# Patient Record
Sex: FEMALE | Race: BLACK OR AFRICAN AMERICAN | Employment: FULL TIME | URBAN - METROPOLITAN AREA
[De-identification: names, ages, dates, MRNs, and addresses within clinical notes are randomized per-mention and may not be internally consistent; named-entity substitution may affect disease eponyms.]

---

## 2019-12-16 ENCOUNTER — OFFICE VISIT (OUTPATIENT)
Dept: INTERNAL MEDICINE CLINIC | Age: 54
End: 2019-12-16

## 2019-12-16 VITALS
WEIGHT: 184 LBS | HEART RATE: 70 BPM | HEIGHT: 65 IN | SYSTOLIC BLOOD PRESSURE: 180 MMHG | DIASTOLIC BLOOD PRESSURE: 105 MMHG | OXYGEN SATURATION: 98 % | BODY MASS INDEX: 30.66 KG/M2 | RESPIRATION RATE: 20 BRPM | TEMPERATURE: 98.5 F

## 2019-12-16 DIAGNOSIS — Z12.31 ENCOUNTER FOR SCREENING MAMMOGRAM FOR BREAST CANCER: ICD-10-CM

## 2019-12-16 DIAGNOSIS — Z23 ENCOUNTER FOR IMMUNIZATION: ICD-10-CM

## 2019-12-16 DIAGNOSIS — Z12.11 SCREENING FOR COLON CANCER: ICD-10-CM

## 2019-12-16 DIAGNOSIS — I10 ESSENTIAL HYPERTENSION: Primary | ICD-10-CM

## 2019-12-16 DIAGNOSIS — Z13.220 SCREENING CHOLESTEROL LEVEL: ICD-10-CM

## 2019-12-16 DIAGNOSIS — R01.1 MURMUR: ICD-10-CM

## 2019-12-16 RX ORDER — AMLODIPINE BESYLATE 10 MG/1
10 TABLET ORAL DAILY
Qty: 30 TAB | Refills: 2 | Status: SHIPPED | OUTPATIENT
Start: 2019-12-16 | End: 2020-03-09

## 2019-12-16 NOTE — PROGRESS NOTES
Chief Complaint   Patient presents with   [address]     she is a 48y.o. year old female who presents for evaluation. The patient presents the office to get established. She reports that she was referred here by Alvin Maldonado NP. She reports that she has not seen a physician in approximately 2 to 3 years. She states that she has a history of high blood pressure but has not been taking medicine for it. She was on she believes hydrochlorothiazide at one point but it made her swell so she stopped the medication and she never went back to get on something different.   Allergies   Allergen Reactions    Other Plant, Animal, Environmental Sneezing    Hydrochlorothiazide Swelling     Past Medical History:   Diagnosis Date    Back pain     Constipation     intermttent    Hypertension      Family History   Problem Relation Age of Onset    Stroke Mother     Breast Cancer Sister     Hypertension Sister     Hypertension Brother     Prostate Cancer Brother     Diabetes Brother     Cancer Brother         throat     Social History     Socioeconomic History    Marital status:      Spouse name: Not on file    Number of children: 2    Years of education: Not on file    Highest education level: Not on file   Tobacco Use    Smoking status: Never Smoker    Smokeless tobacco: Never Used   Substance and Sexual Activity    Alcohol use: Never     Frequency: Never    Drug use: Never    Sexual activity: Yes     Partners: Male     Birth control/protection: None     Past Surgical History:   Procedure Laterality Date    HX PARTIAL HYSTERECTOMY  2012             Review of Systems - negative except as listed above    Objective:     Vitals:    12/16/19 1429 12/16/19 1444   BP: (!) 193/109 (!) 180/105   Pulse: 73 70   Resp: 20    Temp: 98.5 °F (36.9 °C)    TempSrc: Oral    SpO2: 98%    Weight: 184 lb (83.5 kg)    Height: 5' 5\" (1.651 m)      Physical Examination: General appearance - alert, well appearing, and in no distress  Eyes - pupils equal and reactive, extraocular eye movements intact  Mouth - mucous membranes moist, pharynx normal without lesions  Neck - supple, no significant adenopathy  Chest - clear to auscultation, no wheezes, rales or rhonchi, symmetric air entry  Heart - normal rate and regular rhythm  Abdomen - soft, nontender, nondistended, no masses or organomegaly  Extremities - no pedal edema noted, intact peripheral pulses    Assessment/ Plan:   Diagnoses and all orders for this visit:    1. Essential hypertension  -     CBC WITH AUTOMATED DIFF; Future  -     METABOLIC PANEL, COMPREHENSIVE; Future  -     amLODIPine (NORVASC) 10 mg tablet; Take 1 Tab by mouth daily.  -     ECHO ADULT COMPLETE; Future    2. Encounter for immunization  -     varicella-zoster recombinant, PF, (SHINGRIX, PF,) 50 mcg/0.5 mL susr injection; 0.5 mL by IntraMUSCular route once for 1 dose. Repeat one in 2-6 months    3. Murmur  -     ECHO ADULT COMPLETE; Future    4. Screening cholesterol level  -     CBC WITH AUTOMATED DIFF; Future  -     LIPID PANEL; Future    5. Encounter for screening mammogram for breast cancer  -     [facility] MAMMO BI SCREENING INCL CAD; Future    6. Screening for colon cancer  -     REFERRAL TO GASTROENTEROLOGY       The patient I spoke for a period of time about her blood pressure and the need to get it under control. I also would like for her to have an echo done. The patient reports that she was told that she had a murmur at a young age but had not heard anything else about it since then. Today during exam I explained her that I hear a murmur and I think we should get an echo done to follow this up. I also placed an order for the patient to have her mammogram done and to see the gastroenterologist so that she can have a colonoscopy done. The patient has agreed to be started on blood pressure medication. She will be started on Norvasc 10 mg today.   I have asked the patient to return to the office in 2 weeks that we can recheck her blood pressure. Patient also was given a prescription to get labs done today. She will be contacted with these results once they are back. I have discussed the diagnosis with the patient and the intended plan as seen in the above orders. The patient has received an after-visit summary and questions were answered concerning future plans.      Medication Side Effects and Warnings were discussed with patient: yes  Patient Labs were reviewed and or requested: n/a  Patient Past Records were reviewed and or requested  yes    Yesi Ferreira PA-C

## 2019-12-16 NOTE — PROGRESS NOTES
Patient states she takes Neurontin 300mg, does not have pill bottle with her today, they have  and needs refill. Patient takes for back pain. Previous PCP Dr. Vera Austin, retired, [place]. PAP done last . Patient does not take any BP meds. Last PCP visit in 3 years.

## 2019-12-17 ENCOUNTER — HOSPITAL ENCOUNTER (OUTPATIENT)
Dept: LAB | Age: 54
Discharge: HOME | End: 2019-12-17

## 2019-12-17 DIAGNOSIS — I10 ESSENTIAL HYPERTENSION: ICD-10-CM

## 2019-12-17 DIAGNOSIS — Z13.220 SCREENING CHOLESTEROL LEVEL: ICD-10-CM

## 2019-12-17 LAB
ALBUMIN SERPL-MCNC: 3.5 G/DL (ref 3.5–5)
ALBUMIN/GLOB SERPL: 0.9 {RATIO} (ref 1.1–2.2)
ALP SERPL-CCNC: 111 U/L (ref 45–117)
ALT SERPL-CCNC: 24 U/L (ref 12–78)
ANION GAP SERPL CALC-SCNC: 2 MMOL/L (ref 5–15)
AST SERPL-CCNC: 20 U/L (ref 15–37)
BASOPHILS # BLD: 0 K/UL (ref 0–0.1)
BASOPHILS NFR BLD: 0 % (ref 0–1)
BILIRUB SERPL-MCNC: 0.4 MG/DL (ref 0.2–1)
BUN SERPL-MCNC: 19 MG/DL (ref 6–20)
BUN/CREAT SERPL: 22 (ref 12–20)
CALCIUM SERPL-MCNC: 9 MG/DL (ref 8.5–10.1)
CHLORIDE SERPL-SCNC: 108 MMOL/L (ref 97–108)
CHOLEST SERPL-MCNC: 157 MG/DL
CO2 SERPL-SCNC: 29 MMOL/L (ref 21–32)
CREAT SERPL-MCNC: 0.86 MG/DL (ref 0.55–1.02)
DIFFERENTIAL METHOD BLD: NORMAL
EOSINOPHIL # BLD: 0.2 K/UL (ref 0–0.4)
EOSINOPHIL NFR BLD: 4 % (ref 0–7)
ERYTHROCYTE [DISTWIDTH] IN BLOOD BY AUTOMATED COUNT: 13.2 % (ref 11.5–14.5)
GLOBULIN SER CALC-MCNC: 3.8 G/DL (ref 2–4)
GLUCOSE SERPL-MCNC: 84 MG/DL (ref 65–100)
HCT VFR BLD AUTO: 42.5 % (ref 35–47)
HDLC SERPL-MCNC: 85 MG/DL
HDLC SERPL: 1.8 {RATIO} (ref 0–5)
HGB BLD-MCNC: 13.4 G/DL (ref 11.5–16)
IMM GRANULOCYTES # BLD AUTO: 0 K/UL (ref 0–0.04)
IMM GRANULOCYTES NFR BLD AUTO: 0 % (ref 0–0.5)
LDLC SERPL CALC-MCNC: 60.6 MG/DL (ref 0–100)
LIPID PROFILE,FLP: NORMAL
LYMPHOCYTES # BLD: 1.7 K/UL (ref 0.8–3.5)
LYMPHOCYTES NFR BLD: 28 % (ref 12–49)
MCH RBC QN AUTO: 29.1 PG (ref 26–34)
MCHC RBC AUTO-ENTMCNC: 31.5 G/DL (ref 30–36.5)
MCV RBC AUTO: 92.2 FL (ref 80–99)
MONOCYTES # BLD: 0.6 K/UL (ref 0–1)
MONOCYTES NFR BLD: 10 % (ref 5–13)
NEUTS SEG # BLD: 3.5 K/UL (ref 1.8–8)
NEUTS SEG NFR BLD: 58 % (ref 32–75)
NRBC # BLD: 0 K/UL (ref 0–0.01)
NRBC BLD-RTO: 0 PER 100 WBC
PLATELET # BLD AUTO: 160 K/UL (ref 150–400)
PMV BLD AUTO: 11.7 FL (ref 8.9–12.9)
POTASSIUM SERPL-SCNC: 3.8 MMOL/L (ref 3.5–5.1)
PROT SERPL-MCNC: 7.3 G/DL (ref 6.4–8.2)
RBC # BLD AUTO: 4.61 M/UL (ref 3.8–5.2)
SODIUM SERPL-SCNC: 139 MMOL/L (ref 136–145)
TRIGL SERPL-MCNC: 57 MG/DL (ref ?–150)
VLDLC SERPL CALC-MCNC: 11.4 MG/DL
WBC # BLD AUTO: 6 K/UL (ref 3.6–11)

## 2019-12-18 NOTE — PROGRESS NOTES
Please let the patient know her cholesterol looks good. Her CMP looks good. Her CBC looks good. Thank you.

## 2019-12-24 ENCOUNTER — OFFICE VISIT (OUTPATIENT)
Dept: INTERNAL MEDICINE CLINIC | Age: 54
End: 2019-12-24

## 2019-12-24 VITALS
WEIGHT: 181 LBS | BODY MASS INDEX: 30.16 KG/M2 | HEART RATE: 72 BPM | SYSTOLIC BLOOD PRESSURE: 146 MMHG | TEMPERATURE: 98.4 F | OXYGEN SATURATION: 99 % | DIASTOLIC BLOOD PRESSURE: 98 MMHG | HEIGHT: 65 IN

## 2019-12-24 DIAGNOSIS — I10 ESSENTIAL HYPERTENSION: Primary | ICD-10-CM

## 2019-12-24 RX ORDER — NITROFURANTOIN 25; 75 MG/1; MG/1
CAPSULE ORAL
COMMUNITY
Start: 2019-12-15 | End: 2020-04-14 | Stop reason: ALTCHOICE

## 2019-12-24 RX ORDER — OLMESARTAN MEDOXOMIL 5 MG/1
5 TABLET ORAL DAILY
Qty: 30 TAB | Refills: 1 | Status: SHIPPED | OUTPATIENT
Start: 2019-12-24 | End: 2020-01-16

## 2019-12-24 NOTE — PROGRESS NOTES
Subjective:     Dane Julio is a 48 y.o. female who presents for follow up of hypertension. Diet and Lifestyle: patient reports she is starting to watch her diet more. Home BP Monitoring: is not measured at home    Cardiovascular ROS: taking medications as instructed, no medication side effects noted, no TIA's, no chest pain on exertion, no dyspnea on exertion, no swelling of ankles. New concerns: recheck of her blood pressure. Reports she had a slight headache at first start of blood pressure medication but that went away. There are no active problems to display for this patient. Current Outpatient Medications   Medication Sig Dispense Refill    olmesartan (BENICAR) 5 mg tablet Take 1 Tab by mouth daily. 30 Tab 1    amLODIPine (NORVASC) 10 mg tablet Take 1 Tab by mouth daily. 30 Tab 2    nitrofurantoin, macrocrystal-monohydrate, (MACROBID) 100 mg capsule        Allergies   Allergen Reactions    Other Plant, Animal, Environmental Sneezing    Hydrochlorothiazide Swelling             Review of Systems, additional:  Pertinent items are noted in HPI. Objective:     Visit Vitals  BP (!) 146/98   Pulse 72   Temp 98.4 °F (36.9 °C) (Oral)   Ht 5' 5\" (1.651 m)   Wt 181 lb (82.1 kg)   SpO2 99%   BMI 30.12 kg/m²     Appearance: alert, well appearing, and in no distress. General exam: CVS exam BP noted to be mildly elevated today in office, S1, S2 normal, no gallop, murmur heard, chest clear, , no edema. Lab review: today I reviewed the patient's last set of labs. Assessment/Plan:     hypertension needs improvement. added benicar today. Diagnoses and all orders for this visit:    1. Essential hypertension  -     olmesartan (BENICAR) 5 mg tablet; Take 1 Tab by mouth daily. patient's blood pressure has improved since the last time but still not at goal. Will add benicar today and she is to check her blood pressure at work. benicar may need increasing based on the numbers.  Also will need to check for the patient cardiologist that go to [facility] due to her insurance. She will follow up here in one month. Advised patient to try to add regular exercise and watch sodium intake.

## 2020-03-09 DIAGNOSIS — I10 ESSENTIAL HYPERTENSION: ICD-10-CM

## 2020-03-09 RX ORDER — AMLODIPINE BESYLATE 10 MG/1
TABLET ORAL
Qty: 90 TAB | Refills: 0 | Status: SHIPPED | OUTPATIENT
Start: 2020-03-09 | End: 2020-06-03

## 2020-04-14 ENCOUNTER — VIRTUAL VISIT (OUTPATIENT)
Dept: INTERNAL MEDICINE CLINIC | Age: 55
End: 2020-04-14

## 2020-04-14 VITALS
RESPIRATION RATE: 16 BRPM | SYSTOLIC BLOOD PRESSURE: 128 MMHG | DIASTOLIC BLOOD PRESSURE: 75 MMHG | HEIGHT: 65 IN | WEIGHT: 182 LBS | HEART RATE: 61 BPM | BODY MASS INDEX: 30.32 KG/M2

## 2020-04-14 DIAGNOSIS — I10 ESSENTIAL HYPERTENSION: Primary | ICD-10-CM

## 2020-04-14 DIAGNOSIS — R60.9 PERIPHERAL EDEMA: ICD-10-CM

## 2020-04-14 NOTE — PROGRESS NOTES
Consent: Camie Cooks, who was seen by synchronous (real-time) audio-video technology, and/or her healthcare decision maker, is aware that this patient-initiated, Telehealth encounter on 4/14/2020 is a billable service, with coverage as determined by her insurance carrier. She is aware that she may receive a bill and has provided verbal consent to proceed: Yes. Assessment & Plan:   Diagnoses and all orders for this visit:    1. Essential hypertension    2. Peripheral edema    Patient reports her blood pressure has been doing well at home. I explained to her that some people do experience some peripheral edema with the use of amlodipine. I have asked her to cut the amlodipine in half. Instead of taking in 10 mg daily she now will be taking 5 mg daily. She will increase her Benicar from 5 mg daily to 10 mg daily. She will follow-up with me in 1 week to let me know if the peripheral edema is better. I advised the patient to try to stay as active as she can do on this COVID-19 pandemic. Advised patient make sure that she is staying well-hydrated on throughout the day. I spent at least 15 minutes with this established patient, and >50% of the time was spent counseling and/or coordinating care regarding hypertension and swelling of ankles  712  Subjective:   Camie Cooks is a 48 y.o. female who was seen for Ankle swelling and Hand Swelling    Patient presents with concerns of some edema for the past 3 weeks or so. She reports that she has been noticing that she has some edema of her ankles and her hands. She states that it goes down by the morning time. Patient reports that she does not use sodium when cooking. She does not believe she is increased any hidden sodium either. The patient denies shortness of breath or chest pain. She last had labs done back in December. Patient reports currently she is working a altered schedule.   Prior to Admission medications    Medication Sig Start Date End Date Taking? Authorizing Provider   amLODIPine (NORVASC) 10 mg tablet TAKE 1 TABLET BY MOUTH EVERY DAY 3/9/20  Yes Yesi Ferreira PA-C   olmesartan (BENICAR) 5 mg tablet TAKE 1 TABLET BY MOUTH EVERY DAY 1/16/20  Yes Yesi Ferreira PA-C   nitrofurantoin, macrocrystal-monohydrate, (MACROBID) 100 mg capsule  12/15/19 4/14/20  Provider, Historical     Allergies   Allergen Reactions    Other Plant, Animal, Environmental Sneezing    Hydrochlorothiazide Swelling       There are no active problems to display for this patient. Current Outpatient Medications   Medication Sig Dispense Refill    amLODIPine (NORVASC) 10 mg tablet TAKE 1 TABLET BY MOUTH EVERY DAY 90 Tab 0    olmesartan (BENICAR) 5 mg tablet TAKE 1 TABLET BY MOUTH EVERY DAY 90 Tab 1     Allergies   Allergen Reactions    Other Plant, Animal, Environmental Sneezing    Hydrochlorothiazide Swelling       ROS      Objective:     Visit Vitals  /75   Pulse 61   Resp 16   Ht 5' 5\" (1.651 m)   Wt 182 lb (82.6 kg)   BMI 30.29 kg/m²      General: alert, cooperative, no distress   Mental  status: normal mood, behavior, speech, dress, motor activity, and thought processes, able to follow commands   HENT: NCAT   Neck: no visualized mass   Resp: no respiratory distress   Neuro: no gross deficits   Skin: no discoloration or lesions of concern on visible areas   Psychiatric: normal affect, consistent with stated mood, no evidence of hallucinations     Additional exam findings: We discussed the expected course, resolution and complications of the diagnosis(es) in detail. Medication risks, benefits, costs, interactions, and alternatives were discussed as indicated. I advised her to contact the office if her condition worsens, changes or fails to improve as anticipated. She expressed understanding with the diagnosis(es) and plan. Vianey Hawley is a 48 y.o. female being evaluated by a video visit encounter for concerns as above.   DAVON caregiver was present when appropriate. Due to this being a TeleHealth encounter (During ILBNY-21 public health emergency), evaluation of the following organ systems was limited: Vitals/Constitutional/EENT/Resp/CV/GI//MS/Neuro/Skin/Heme-Lymph-Imm. Pursuant to the emergency declaration under the [address] [place] [address], [facility] waiver authority and the [employer] and [employer] Act, this Virtual  Visit was conducted, with patient's (and/or legal guardian's) consent, to reduce the patient's risk of exposure to COVID-19 and provide necessary medical care. Services were provided through a video synchronous discussion virtually to substitute for in-person clinic visit. Patient and provider were located at their individual homes.         Delta Ferreira PA-C

## 2020-05-20 DIAGNOSIS — I10 ESSENTIAL HYPERTENSION: ICD-10-CM

## 2020-05-20 NOTE — TELEPHONE ENCOUNTER
Cousins, Crystal [employer]             Patient would like a refill of her blood pressure medication she does not know the name of it, but its not the Amlodipine. The pharmacy is [facility], and patient is out of the medication.

## 2020-05-21 RX ORDER — OLMESARTAN MEDOXOMIL 5 MG/1
TABLET ORAL
Qty: 90 TAB | Refills: 1 | Status: SHIPPED | OUTPATIENT
Start: 2020-05-21 | End: 2020-07-15 | Stop reason: SDUPTHER

## 2020-06-03 DIAGNOSIS — I10 ESSENTIAL HYPERTENSION: ICD-10-CM

## 2020-06-03 RX ORDER — AMLODIPINE BESYLATE 10 MG/1
TABLET ORAL
Qty: 90 TAB | Refills: 0 | Status: SHIPPED | OUTPATIENT
Start: 2020-06-03 | End: 2020-09-07 | Stop reason: DRUGHIGH

## 2020-07-15 DIAGNOSIS — I10 ESSENTIAL HYPERTENSION: ICD-10-CM

## 2020-07-15 RX ORDER — OLMESARTAN MEDOXOMIL 5 MG/1
TABLET ORAL
Qty: 90 TAB | Refills: 1 | Status: SHIPPED | OUTPATIENT
Start: 2020-07-15 | End: 2020-09-17 | Stop reason: SDUPTHER

## 2020-07-30 ENCOUNTER — VIRTUAL VISIT (OUTPATIENT)
Dept: INTERNAL MEDICINE CLINIC | Age: 55
End: 2020-07-30

## 2020-07-30 DIAGNOSIS — R23.2 HOT FLASHES: Primary | ICD-10-CM

## 2020-07-30 RX ORDER — GABAPENTIN 300 MG/1
300 CAPSULE ORAL
Qty: 90 CAP | Refills: 0 | Status: SHIPPED | OUTPATIENT
Start: 2020-07-30 | End: 2022-09-14 | Stop reason: SDUPTHER

## 2020-07-30 NOTE — PROGRESS NOTES
Rika De Guzman is a 48 y.o. female who was seen by synchronous (real-time) audio-video technology on 7/30/2020 for Hormone Problem        Assessment & Plan:   Diagnoses and all orders for this visit:    1. Hot flashes  -     gabapentin (NEURONTIN) 300 mg capsule; Take 1 Cap by mouth nightly. Max Daily Amount: 300 mg. The patient I spoke about options for treating hot flashes. We talked about SSRIs versus hormone therapy versus gabapentin. I explained to her that gabapentin has shown to help those women that experience hot flashes at night. The patient will be placed back on gabapentin 300 mg to be taken at night. She will let me know if this helps or not. Also advised the patient to try to get some regular exercise if possible and to maintain a healthy diet. Advised her to stay away from spicy foods. She reports that she is noticed that if she has foods with vinegar that it tends to bring her hot flashes on. I spent at least 15 minutes on this visit with this established patient. 712  Subjective:   Patient presents to the office for evaluation of hot flashes. She reports for the past several months the hot flashes seem to be getting worse at night. She reports that she does get episodes during the day but they are tolerable. The episode that she experienced at night is so severe that she is not able to sleep well. Patient reports at one time she was on gabapentin for pain. She reports that she has been without this medication for some time now. Prior to Admission medications    Medication Sig Start Date End Date Taking? Authorizing Provider   gabapentin (NEURONTIN) 300 mg capsule Take 1 Cap by mouth nightly. Max Daily Amount: 300 mg. 7/30/20  Yes Yesi Ferreira PA-C   olmesartan (BENICAR) 5 mg tablet TAKE 1 TABLET BY MOUTH EVERY DAY  Patient taking differently: Take 10 mg by mouth daily.  TAKE 1 TABLET BY MOUTH EVERY DAY 7/15/20  Yes Yesi Ferreira PA-C   amLODIPine (NORVASC) 10 mg tablet TAKE 1 TABLET BY MOUTH EVERY DAY  Patient taking differently: Take 5 mg by mouth daily. .  6/3/20  Yes Yesi Ferreira, TROY     There are no active problems to display for this patient. Current Outpatient Medications   Medication Sig Dispense Refill    gabapentin (NEURONTIN) 300 mg capsule Take 1 Cap by mouth nightly. Max Daily Amount: 300 mg. 90 Cap 0    olmesartan (BENICAR) 5 mg tablet TAKE 1 TABLET BY MOUTH EVERY DAY (Patient taking differently: Take 10 mg by mouth daily. TAKE 1 TABLET BY MOUTH EVERY DAY) 90 Tab 1    amLODIPine (NORVASC) 10 mg tablet TAKE 1 TABLET BY MOUTH EVERY DAY (Patient taking differently: Take 5 mg by mouth daily. Tresea Avi ) 90 Tab 0     Allergies   Allergen Reactions    Other Plant, Animal, Environmental Sneezing    Hydrochlorothiazide Swelling       ROS    Objective:     Patient-Reported Vitals 7/30/2020   Patient-Reported Weight 186lb   Patient-Reported Height [id number]      General: alert, cooperative, no distress   Mental  status: normal mood, behavior, speech, dress, motor activity, and thought processes, able to follow commands   HENT: NCAT   Neck: no visualized mass   Resp: no respiratory distress   Neuro: no gross deficits   Skin: no discoloration or lesions of concern on visible areas   Psychiatric: normal affect, consistent with stated mood, no evidence of hallucinations     Additional exam findings: We discussed the expected course, resolution and complications of the diagnosis(es) in detail. Medication risks, benefits, costs, interactions, and alternatives were discussed as indicated. I advised her to contact the office if her condition worsens, changes or fails to improve as anticipated. She expressed understanding with the diagnosis(es) and plan.        Rika De Guzman, who was evaluated through a patient-initiated, synchronous (real-time) audio-video encounter, and/or her healthcare decision maker, is aware that it is a billable service, with coverage as determined by her insurance carrier. She provided verbal consent to proceed: Yes, and patient identification was verified. It was conducted pursuant to the emergency declaration under the [address] [place] [address] and the [employer] General Act. A caregiver was present when appropriate. Ability to conduct physical exam was limited. I was at home. The patient was at home.       Orlando Ferreira PA-C

## 2020-09-07 ENCOUNTER — TELEPHONE (OUTPATIENT)
Dept: INTERNAL MEDICINE CLINIC | Age: 55
End: 2020-09-07

## 2020-09-07 RX ORDER — AMLODIPINE BESYLATE 5 MG/1
5 TABLET ORAL DAILY
Qty: 90 TAB | Refills: 0 | Status: SHIPPED | OUTPATIENT
Start: 2020-09-07 | End: 2020-12-18

## 2020-09-07 NOTE — TELEPHONE ENCOUNTER
norvasc 5 mg has been sent to the pharmacy.  Patient had reported at last visit she was taking half of the 10 mg tablet

## 2020-09-16 DIAGNOSIS — I10 ESSENTIAL HYPERTENSION: ICD-10-CM

## 2020-09-17 RX ORDER — OLMESARTAN MEDOXOMIL 5 MG/1
10 TABLET ORAL DAILY
Qty: 180 TAB | Refills: 0 | Status: SHIPPED | OUTPATIENT
Start: 2020-09-17 | End: 2020-12-10 | Stop reason: DRUGHIGH

## 2020-09-17 RX ORDER — OLMESARTAN MEDOXOMIL 5 MG/1
TABLET ORAL
Qty: 90 TAB | Refills: 1 | OUTPATIENT
Start: 2020-09-17

## 2020-11-30 ENCOUNTER — TELEPHONE (OUTPATIENT)
Dept: INTERNAL MEDICINE CLINIC | Age: 55
End: 2020-11-30

## 2020-12-10 ENCOUNTER — TELEPHONE (OUTPATIENT)
Dept: INTERNAL MEDICINE CLINIC | Age: 55
End: 2020-12-10

## 2020-12-10 RX ORDER — OLMESARTAN MEDOXOMIL 5 MG/1
10 TABLET ORAL DAILY
Qty: 180 TAB | Refills: 1 | Status: SHIPPED | OUTPATIENT
Start: 2020-12-10 | End: 2021-06-01

## 2020-12-18 RX ORDER — AMLODIPINE BESYLATE 5 MG/1
TABLET ORAL
Qty: 90 TAB | Refills: 0 | Status: SHIPPED | OUTPATIENT
Start: 2020-12-18 | End: 2021-03-15

## 2021-01-15 ENCOUNTER — VIRTUAL VISIT (OUTPATIENT)
Dept: INTERNAL MEDICINE CLINIC | Age: 56
End: 2021-01-15
Payer: COMMERCIAL

## 2021-01-15 ENCOUNTER — TELEPHONE (OUTPATIENT)
Dept: INTERNAL MEDICINE CLINIC | Age: 56
End: 2021-01-15

## 2021-01-15 DIAGNOSIS — M79.671 PAIN OF RIGHT HEEL: Primary | ICD-10-CM

## 2021-01-15 PROCEDURE — 99213 OFFICE O/P EST LOW 20 MIN: CPT | Performed by: PHYSICIAN ASSISTANT

## 2021-01-15 RX ORDER — DICLOFENAC SODIUM 50 MG/1
50 TABLET, DELAYED RELEASE ORAL 2 TIMES DAILY
Qty: 30 TAB | Refills: 0 | Status: SHIPPED | OUTPATIENT
Start: 2021-01-15 | End: 2021-01-26

## 2021-01-15 NOTE — PROGRESS NOTES
Celia Durham is a 50 y.o. female who was seen by synchronous (real-time) audio-video technology on 1/15/2021 for Swelling        Assessment & Plan:   Diagnoses and all orders for this visit:    1. Pain of right heel  -     diclofenac EC (VOLTAREN) 50 mg EC tablet; Take 1 Tab by mouth two (2) times a day. I explained to the patient is on the symptoms that she is describing sounds like plantar fasciitis. I have advised the patient to take her medication as prescribed and to try ice throughout the day if possible. Also advised the patient of some light stretches that she can do. Told her that if the stretches causes pain she is not to do them. Encouraged the patient to get the boot as prescribed by the orthopedic as soon as possible. Patient should contact the office if symptoms are not improving. I spent at least 20 minutes on this visit with this established patient. 712  Subjective:   Patient presents for right heel pain and foot pain. She reports that she saw the orthopedic doctor back on December 7 and was diagnosed with tendinitis. She reports that since that time she has been taking Advil intermittently approximately 600 mg twice a day. She reports that the orthopedic doctor advised her to get a boot but she has not been to pick that up yet. Patient reports that she plans to try to pick the buildup today. Patient states that she does a lot of walking at work and believes this may be irritating the area. She reports that first thing in the morning her heel is extremely sore. When she is up and moving throughout the day she also has some swelling of her ankle. Patient reports that she had x-rays done and no abnormality was seen. She is scheduled to follow-up with the orthopedic doctor on the 22nd. Prior to Admission medications    Medication Sig Start Date End Date Taking? Authorizing Provider   diclofenac EC (VOLTAREN) 50 mg EC tablet Take 1 Tab by mouth two (2) times a day.  1/15/21  Yes Hanna, Yesi ASHLI PA-C   amLODIPine (NORVASC) 5 mg tablet TAKE 1 TABLET BY MOUTH EVERY DAY 12/18/20  Yes Pennington, Yesi ASHLI PA-C   olmesartan (BENICAR) 5 mg tablet Take 2 Tabs by mouth daily. 12/10/20  Yes Hanna, Yesi ASHLI PA-C   gabapentin (NEURONTIN) 300 mg capsule Take 1 Cap by mouth nightly. Max Daily Amount: 300 mg. Patient taking differently: Take 300 mg by mouth nightly as needed. 7/30/20  Yes Hanna Yesi ASHLI PA-C     There are no active problems to display for this patient. Current Outpatient Medications   Medication Sig Dispense Refill    diclofenac EC (VOLTAREN) 50 mg EC tablet Take 1 Tab by mouth two (2) times a day. 30 Tab 0    amLODIPine (NORVASC) 5 mg tablet TAKE 1 TABLET BY MOUTH EVERY DAY 90 Tab 0    olmesartan (BENICAR) 5 mg tablet Take 2 Tabs by mouth daily. 180 Tab 1    gabapentin (NEURONTIN) 300 mg capsule Take 1 Cap by mouth nightly. Max Daily Amount: 300 mg. (Patient taking differently: Take 300 mg by mouth nightly as needed.) 90 Cap 0     Allergies   Allergen Reactions    Other Plant, Animal, Environmental Sneezing    Hydrochlorothiazide Swelling       ROS  See above   Objective:     Patient-Reported Vitals 1/15/2021   Patient-Reported Weight -   Patient-Reported Height -   Patient-Reported Pulse 82   Patient-Reported Temperature 98.7   Patient-Reported SpO2 100   Patient-Reported Systolic  347   Patient-Reported Diastolic 80      General: alert, cooperative, no distress   Mental  status: normal mood, behavior, speech, dress, motor activity, and thought processes, able to follow commands   HENT: NCAT   Neck: no visualized mass   Resp: no respiratory distress   Neuro: no gross deficits   Skin: no discoloration or lesions of concern on visible areas   Psychiatric: normal affect, consistent with stated mood, no evidence of hallucinations   Right footmild edema noted along the lateral aspect of ankle. Tenderness noted over the calcaneus.   Range of motion is within normal limits. Additional exam findings: We discussed the expected course, resolution and complications of the diagnosis(es) in detail. Medication risks, benefits, costs, interactions, and alternatives were discussed as indicated. I advised her to contact the office if her condition worsens, changes or fails to improve as anticipated. She expressed understanding with the diagnosis(es) and plan. Geri Lambert, who was evaluated through a patient-initiated, synchronous (real-time) audio-video encounter, and/or her healthcare decision maker, is aware that it is a billable service, with coverage as determined by her insurance carrier. She provided verbal consent to proceed: Yes, and patient identification was verified. It was conducted pursuant to the emergency declaration under the [address] [place] [address] authority and the [employer] General Act. A caregiver was present when appropriate. Ability to conduct physical exam was limited. I was at home. The patient was at home.       Fariba Ferreira PA-C

## 2021-01-26 DIAGNOSIS — M79.671 PAIN OF RIGHT HEEL: ICD-10-CM

## 2021-01-26 RX ORDER — DICLOFENAC SODIUM 50 MG/1
TABLET, DELAYED RELEASE ORAL
Qty: 30 TAB | Refills: 0 | Status: SHIPPED | OUTPATIENT
Start: 2021-01-26 | End: 2021-02-15

## 2021-02-14 DIAGNOSIS — M79.671 PAIN OF RIGHT HEEL: ICD-10-CM

## 2021-02-15 RX ORDER — DICLOFENAC SODIUM 50 MG/1
TABLET, DELAYED RELEASE ORAL
Qty: 30 TAB | Refills: 0 | Status: SHIPPED | OUTPATIENT
Start: 2021-02-15 | End: 2021-03-16

## 2021-02-25 ENCOUNTER — VIRTUAL VISIT (OUTPATIENT)
Dept: INTERNAL MEDICINE CLINIC | Age: 56
End: 2021-02-25
Payer: COMMERCIAL

## 2021-02-25 DIAGNOSIS — J30.89 ENVIRONMENTAL AND SEASONAL ALLERGIES: ICD-10-CM

## 2021-02-25 DIAGNOSIS — H02.89 EYELID PAIN, LEFT: Primary | ICD-10-CM

## 2021-02-25 PROCEDURE — 99213 OFFICE O/P EST LOW 20 MIN: CPT | Performed by: PHYSICIAN ASSISTANT

## 2021-02-25 RX ORDER — FLUTICASONE PROPIONATE 50 MCG
2 SPRAY, SUSPENSION (ML) NASAL DAILY
Qty: 1 BOTTLE | Refills: 1 | Status: SHIPPED | OUTPATIENT
Start: 2021-02-25 | End: 2021-03-19

## 2021-02-25 RX ORDER — ERYTHROMYCIN 5 MG/G
OINTMENT OPHTHALMIC
Qty: 3.5 G | Refills: 0 | Status: SHIPPED | OUTPATIENT
Start: 2021-02-25 | End: 2021-06-04 | Stop reason: ALTCHOICE

## 2021-02-25 NOTE — PROGRESS NOTES
Christ Ortiz is a 50 y.o. female who was seen by synchronous (real-time) audio-video technology on 2/25/2021 for Eye Problem        Assessment & Plan:   Diagnoses and all orders for this visit:    1. Eyelid pain, left  -     erythromycin (ILOTYCIN) ophthalmic ointment; Apply a small ribbon to the left eye lid TID for 7 days    2. Environmental and seasonal allergies  -     fluticasone propionate (FLONASE) 50 mcg/actuation nasal spray; 2 Sprays by Both Nostrils route daily. Advised patient that if providers not improve or she notices different symptoms to please follow-up. May continue to use warm compress to the area. I spent at least 20 minutes on this visit with this established patient. 712  Subjective:   Patient presents for evaluation of her left eye. She states last night she started having some pain of the left eyelid. This morning when she woke up the eyelid was swollen. She reports she is not able to appreciate a bump at this time. Patient did use a warm compress to the area and states that it felt little better. She reports that the eye is sore. She denies any changes to her vision. Prior to Admission medications    Medication Sig Start Date End Date Taking? Authorizing Provider   fluticasone propionate (FLONASE) 50 mcg/actuation nasal spray 2 Sprays by Both Nostrils route daily. 2/25/21  Yes [place] Yesi ASHLI PA-C   erythromycin (ILOTYCIN) ophthalmic ointment Apply a small ribbon to the left eye lid TID for 7 days 2/25/21  Yes [place], Yesi ASHLI, PA-C   diclofenac EC (VOLTAREN) 50 mg EC tablet TAKE 1 TABLET BY MOUTH TWICE A DAY 2/15/21  Yes [place], Yesi D, PA-C   amLODIPine (NORVASC) 5 mg tablet TAKE 1 TABLET BY MOUTH EVERY DAY 12/18/20  Yes [place] Yesi D, PA-C   olmesartan (BENICAR) 5 mg tablet Take 2 Tabs by mouth daily. 12/10/20  Yes [place], Yesi D, PA-C   gabapentin (NEURONTIN) 300 mg capsule Take 1 Cap by mouth nightly. Max Daily Amount: 300 mg.   Patient taking differently: Take 300 mg by mouth nightly as needed. 7/30/20  Yes Yesi Ferreira, TROY     There are no active problems to display for this patient. Current Outpatient Medications   Medication Sig Dispense Refill    fluticasone propionate (FLONASE) 50 mcg/actuation nasal spray 2 Sprays by Both Nostrils route daily. 1 Bottle 1    erythromycin (ILOTYCIN) ophthalmic ointment Apply a small ribbon to the left eye lid TID for 7 days 3.5 g 0    diclofenac EC (VOLTAREN) 50 mg EC tablet TAKE 1 TABLET BY MOUTH TWICE A DAY 30 Tab 0    amLODIPine (NORVASC) 5 mg tablet TAKE 1 TABLET BY MOUTH EVERY DAY 90 Tab 0    olmesartan (BENICAR) 5 mg tablet Take 2 Tabs by mouth daily. 180 Tab 1    gabapentin (NEURONTIN) 300 mg capsule Take 1 Cap by mouth nightly. Max Daily Amount: 300 mg. (Patient taking differently: Take 300 mg by mouth nightly as needed.) 90 Cap 0     Allergies   Allergen Reactions    Other Plant, Animal, Environmental Sneezing    Hydrochlorothiazide Swelling       ROS  See above  Objective:     Patient-Reported Vitals 1/15/2021   Patient-Reported Weight -   Patient-Reported Height -   Patient-Reported Pulse 82   Patient-Reported Temperature 98.7   Patient-Reported SpO2 100   Patient-Reported Systolic  629   Patient-Reported Diastolic 80      General: alert, cooperative, no distress   Mental  status: normal mood, behavior, speech, dress, motor activity, and thought processes, able to follow commands   HENT: Left eye- edema noted of the left upper lid. Tenderness noted. Patient denies pain of the eye or visual changes   Neck: no visualized mass   Resp: no respiratory distress   Neuro: no gross deficits   Skin: no discoloration or lesions of concern on visible areas   Psychiatric: normal affect, consistent with stated mood, no evidence of hallucinations     Additional exam findings: We discussed the expected course, resolution and complications of the diagnosis(es) in detail.   Medication risks, benefits, costs, interactions, and alternatives were discussed as indicated. I advised her to contact the office if her condition worsens, changes or fails to improve as anticipated. She expressed understanding with the diagnosis(es) and plan. Librado Man, who was evaluated through a patient-initiated, synchronous (real-time) audio-video encounter, and/or her healthcare decision maker, is aware that it is a billable service, with coverage as determined by her insurance carrier. She provided verbal consent to proceed: Yes, and patient identification was verified. It was conducted pursuant to the emergency declaration under the [address] [place] [address], [address] [place] [address] authority and the [employer] General Act. A caregiver was present when appropriate. Ability to conduct physical exam was limited. I was at home. The patient was at home.       Romulo Ferreira PA-C

## 2021-03-16 DIAGNOSIS — M79.671 PAIN OF RIGHT HEEL: ICD-10-CM

## 2021-03-16 RX ORDER — DICLOFENAC SODIUM 50 MG/1
TABLET, DELAYED RELEASE ORAL
Qty: 30 TAB | Refills: 0 | Status: SHIPPED | OUTPATIENT
Start: 2021-03-16

## 2021-03-19 DIAGNOSIS — J30.89 ENVIRONMENTAL AND SEASONAL ALLERGIES: ICD-10-CM

## 2021-03-19 RX ORDER — FLUTICASONE PROPIONATE 50 MCG
SPRAY, SUSPENSION (ML) NASAL
Qty: 1 BOTTLE | Refills: 1 | Status: SHIPPED | OUTPATIENT
Start: 2021-03-19 | End: 2021-04-13

## 2021-04-13 DIAGNOSIS — J30.89 ENVIRONMENTAL AND SEASONAL ALLERGIES: ICD-10-CM

## 2021-04-13 RX ORDER — FLUTICASONE PROPIONATE 50 MCG
SPRAY, SUSPENSION (ML) NASAL
Qty: 1 BOTTLE | Refills: 1 | Status: SHIPPED | OUTPATIENT
Start: 2021-04-13 | End: 2021-04-27 | Stop reason: SDUPTHER

## 2021-04-23 ENCOUNTER — TELEPHONE (OUTPATIENT)
Dept: INTERNAL MEDICINE CLINIC | Age: 56
End: 2021-04-23

## 2021-04-23 NOTE — TELEPHONE ENCOUNTER
Please schedule the patient for an appointment to follow up blood pressure and she is due labs.  Thank you

## 2021-04-27 DIAGNOSIS — J30.89 ENVIRONMENTAL AND SEASONAL ALLERGIES: ICD-10-CM

## 2021-04-27 RX ORDER — FLUTICASONE PROPIONATE 50 MCG
SPRAY, SUSPENSION (ML) NASAL
Qty: 1 BOTTLE | Refills: 1 | Status: SHIPPED | OUTPATIENT
Start: 2021-04-27

## 2021-06-04 ENCOUNTER — OFFICE VISIT (OUTPATIENT)
Dept: INTERNAL MEDICINE CLINIC | Age: 56
End: 2021-06-04
Payer: COMMERCIAL

## 2021-06-04 VITALS
TEMPERATURE: 98.3 F | HEIGHT: 65 IN | SYSTOLIC BLOOD PRESSURE: 136 MMHG | BODY MASS INDEX: 33.49 KG/M2 | HEART RATE: 61 BPM | WEIGHT: 201 LBS | OXYGEN SATURATION: 99 % | DIASTOLIC BLOOD PRESSURE: 84 MMHG | RESPIRATION RATE: 20 BRPM

## 2021-06-04 DIAGNOSIS — I10 ESSENTIAL (PRIMARY) HYPERTENSION: Primary | ICD-10-CM

## 2021-06-04 DIAGNOSIS — N95.1 HOT FLASHES DUE TO MENOPAUSE: ICD-10-CM

## 2021-06-04 PROCEDURE — 99214 OFFICE O/P EST MOD 30 MIN: CPT | Performed by: PHYSICIAN ASSISTANT

## 2021-06-04 RX ORDER — PAROXETINE 7.5 MG/1
7.5 CAPSULE ORAL DAILY
Qty: 30 CAPSULE | Refills: 2 | Status: SHIPPED | OUTPATIENT
Start: 2021-06-04 | End: 2021-07-01 | Stop reason: SDUPTHER

## 2021-06-04 NOTE — PROGRESS NOTES
Subjective:     Sonido Mason is a 50 y.o. female who presents for follow up of hypertension. Diet and Lifestyle: Patient reports that her appetite seems to wax and wane. Home BP Monitoring: Blood pressure readings at home has been averaging around 130/82 120 over 70s. Cardiovascular ROS: taking medications as instructed, no medication side effects noted, no TIA's, no chest pain on exertion, no dyspnea on exertion, no swelling of ankles. New concerns: Patient reports that she was told that she could have surgery done of her right ankle. The patient reports that she is not sure she would like to carry through with the surgery as of yet. She reports that most days she is in pain and she still having swelling. The patient reports that she has had 3 cortisone shots in the ankle but has not benefit from this treatment. She also reports that she still having hot flashes. She reports that the Neurontin just seems to put her to sleep but it has not helped with the hot flashes. .     There are no problems to display for this patient. Current Outpatient Medications   Medication Sig Dispense Refill    PARoxetine mesylate,menop.sym, 7.5 mg cap Take 7.5 mg by mouth daily. 30 Capsule 2    olmesartan (BENICAR) 5 mg tablet TAKE 1 TABLET BY MOUTH EVERY DAY (Patient taking differently: Take 10 mg by mouth daily.) 90 Tablet 0    fluticasone propionate (FLONASE) 50 mcg/actuation nasal spray SPRAY 2 SPRAYS INTO EACH NOSTRIL EVERY DAY (Patient taking differently: daily as needed. SPRAY 2 SPRAYS INTO EACH NOSTRIL EVERY DAY) 1 Bottle 1    diclofenac EC (VOLTAREN) 50 mg EC tablet TAKE 1 TABLET BY MOUTH TWICE A DAY (Patient taking differently: Take 50 mg by mouth daily as needed.) 30 Tab 0    amLODIPine (NORVASC) 5 mg tablet TAKE 1 TABLET BY MOUTH EVERY DAY (Patient taking differently: 2.5 mg.) 90 Tab 0    gabapentin (NEURONTIN) 300 mg capsule Take 1 Cap by mouth nightly. Max Daily Amount: 300 mg.  (Patient taking differently: Take 300 mg by mouth nightly as needed.) 90 Cap 0     Allergies   Allergen Reactions    Other Plant, Animal, Environmental Sneezing    Hydrochlorothiazide Swelling             Review of Systems, additional:  Pertinent items are noted in HPI. Objective:     Visit Vitals  /84   Pulse 61   Temp 98.3 °F (36.8 °C) (Temporal)   Resp 20   Ht 5' 5\" (1.651 m)   Wt 201 lb (91.2 kg)   SpO2 99%   BMI 33.45 kg/m²     Appearance: alert, well appearing, and in no distress and overweight. General exam: CVS exam BP noted to be well controlled today in office, S1, S2 normal, no gallop, no murmur, chest clear, no edema. Right foot. Mild edema noted along the lateral aspect of foot. Patient denies any point tenderness. Range of motion is within normal limits. Lab review: orders written for new lab studies as appropriate; see orders. Assessment/Plan:     hypertension reasonably well controlled. current treatment plan is effective, no change in therapy. Diagnoses and all orders for this visit:    1. Essential (primary) hypertension  -     CBC WITH AUTOMATED DIFF; Future  -     METABOLIC PANEL, COMPREHENSIVE; Future  -     LIPID PANEL; Future    2. Hot flashes due to menopause  -     PARoxetine mesylate,menop.sym, 7.5 mg cap; Take 7.5 mg by mouth daily. Patient is to get labs done. She denies spoke about trying the paroxetine 7.5 to see if this would help with her hot flashes. I explained to the patient that if she does not notice any results with this then she may want to consider seeing GYN to see if they felt that she would be appropriate for hormone therapy. Also advised the patient that she should talk to her surgeon more about the possible procedure for her ankle. It would be nice if the patient could get out of constant pain because this is limiting her activity.

## 2021-06-04 NOTE — PROGRESS NOTES
Patient taking her BP medications differently than prescribed.  Discuss right ankle, patient seeing podiatrist.

## 2021-06-14 RX ORDER — AMLODIPINE BESYLATE 5 MG/1
TABLET ORAL
Qty: 90 TABLET | Refills: 0 | Status: SHIPPED | OUTPATIENT
Start: 2021-06-14 | End: 2021-09-13

## 2021-07-01 DIAGNOSIS — N95.1 HOT FLASHES DUE TO MENOPAUSE: ICD-10-CM

## 2021-07-01 RX ORDER — PAROXETINE 7.5 MG/1
7.5 CAPSULE ORAL DAILY
Qty: 30 CAPSULE | Refills: 2 | Status: SHIPPED | OUTPATIENT
Start: 2021-07-01

## 2021-09-12 DIAGNOSIS — I10 ESSENTIAL (PRIMARY) HYPERTENSION: ICD-10-CM

## 2021-09-13 RX ORDER — OLMESARTAN MEDOXOMIL 5 MG/1
TABLET ORAL
Qty: 90 TABLET | Refills: 0 | Status: SHIPPED | OUTPATIENT
Start: 2021-09-13 | End: 2021-12-09

## 2021-09-13 RX ORDER — AMLODIPINE BESYLATE 5 MG/1
TABLET ORAL
Qty: 90 TABLET | Refills: 0 | Status: SHIPPED | OUTPATIENT
Start: 2021-09-13 | End: 2021-12-09

## 2021-09-13 NOTE — TELEPHONE ENCOUNTER
Called pt   2 pt identifiers   Spoke to pt   Pt advised that the call was to confirm the dosage of her amlodipine  Pt states that she usually have 10Mg and she was told to cut them in half   Pt advised that this will be sent to her pcp to send her meds in   Pt shown understanding and had no further questions

## 2021-11-17 ENCOUNTER — OFFICE VISIT (OUTPATIENT)
Dept: INTERNAL MEDICINE CLINIC | Age: 56
End: 2021-11-17
Payer: COMMERCIAL

## 2021-11-17 ENCOUNTER — NURSE TRIAGE (OUTPATIENT)
Dept: OTHER | Facility: CLINIC | Age: 56
End: 2021-11-17

## 2021-11-17 VITALS
OXYGEN SATURATION: 99 % | TEMPERATURE: 98 F | DIASTOLIC BLOOD PRESSURE: 82 MMHG | RESPIRATION RATE: 20 BRPM | BODY MASS INDEX: 32.99 KG/M2 | SYSTOLIC BLOOD PRESSURE: 127 MMHG | HEIGHT: 65 IN | HEART RATE: 82 BPM | WEIGHT: 198 LBS
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
## 2021-11-17 DIAGNOSIS — M89.8X5 PAIN IN FEMUR: ICD-10-CM

## 2021-11-17 DIAGNOSIS — M25.532 LEFT WRIST PAIN: ICD-10-CM

## 2021-11-17 DIAGNOSIS — M25.522 LEFT ELBOW PAIN: ICD-10-CM

## 2021-11-17 PROCEDURE — 99203 OFFICE O/P NEW LOW 30 MIN: CPT | Performed by: INTERNAL MEDICINE

## 2021-11-17 NOTE — PROGRESS NOTES
Kathya Choi is a 50 y.o. female  Chief Complaint   Patient presents with    Fall     Pt states that she fell down the stairs at her residence when she missed a step. Pt denies hitting her head and LOC. Pt notes that she struck her face, left arm and left hip during the fall.  Arm Pain     unilateral left    Hip Pain     unilateral left     Visit Vitals  /82   Pulse 82   Temp 98 °F (36.7 °C) (Temporal)   Resp 20   Ht 5' 5\" (1.651 m)   Wt 198 lb (89.8 kg)   SpO2 99%   BMI 32.95 kg/m²      Health Maintenance Due   Topic Date Due    Hepatitis C Screening  Never done    COVID-19 Vaccine (1) Never done    DTaP/Tdap/Td series (1 - Tdap) Never done    Colorectal Cancer Screening Combo  Never done    Shingrix Vaccine Age 50> (1 of 2) Never done    Flu Vaccine (1) 09/01/2021       HPI  Mr. Stormy Luz is a 29-year-old female with a history of HTN presents to clinic after a fall the night before. Patient was going up stairs when she missed a step and landed on her left side and sled down 10 steps down. She was just turning around when she fell down, denies any prodromal symptoms, loss of consciousness, palpitation, dizziness, seizure like activity, chest pain, or shortness of breath. After the fall, her left wrist is painful 10/10 in intensity, and has similar pain around left hip, elbows and wrist. She reports there is mild swelling around her left wrist.    .  Past Medical History:   Diagnosis Date    Back pain     Constipation     intermttent    Hypertension       . Galileo Odom MD   Allergies   Allergen Reactions    Other Plant, Animal, Environmental Sneezing    Hydrochlorothiazide Swelling        ROS  Review of Systems   All other systems reviewed and are negative. EXAM  Physical Exam  Constitutional:       General: She is not in acute distress. Appearance: Normal appearance. HENT:      Mouth/Throat:      Mouth: Mucous membranes are moist.      Pharynx: Oropharynx is clear. Eyes:      Extraocular Movements: Extraocular movements intact. Cardiovascular:      Rate and Rhythm: Normal rate and regular rhythm. Heart sounds: No murmur heard. No gallop. Pulmonary:      Effort: No respiratory distress. Breath sounds: Normal breath sounds. No wheezing or rales. Abdominal:      General: Bowel sounds are normal. There is no distension. Palpations: There is no mass. Tenderness: There is no abdominal tenderness. There is no guarding. Musculoskeletal:         General: Tenderness present. No swelling or deformity. Normal range of motion. Arms:       Right lower leg: No edema. Left lower leg: No edema. Legs:       Comments: mild swelling around her wrist, tender at proximal femur, elbow and wrist joint. Decreased ROM at wrist, elbow and shoulder joints due to pain   Skin:     Findings: No lesion. Neurological:      General: No focal deficit present. Mental Status: She is alert. Motor: No weakness. ASSESSMENT/PLAN  Encounter Diagnoses     ICD-10-CM ICD-9-CM   1. Fall, initial encounter  [address] XXXA E888.9   2. Left wrist pain  M25.532 719.43   3. Left elbow pain  M25.522 719.42   4. Pain in femur  M89.8X5 733.90      Orders Placed This Encounter    XR ELBOW LT MIN 3 V    XR WRIST LT AP/LAT    XR FEMUR LT 2 V     -fall is mechanical fall   -Xrays ordered but advised to go to ER for prompt treatment in case there is fractures/dislocations   -tylenol prn  -can use wrist/elbow brace to stabilize painful joints.          Graham Luevano MD

## 2021-11-17 NOTE — PROGRESS NOTES
Chief Complaint   Patient presents with    Fall     Pt states that she fell down the stairs at her residence when she missed a step. Pt denies hitting her head and LOC. Pt notes that she struck her face, left arm and left hip during the fall.  Arm Pain     unilateral left    Hip Pain     unilateral left     Pt notes she fell approx 1 day ago. 1. Have you been to the ER, urgent care clinic since your last visit? Hospitalized since your last visit? No    2. Have you seen or consulted any other health care providers outside of the [address] [place] [address] since your last visit? Include any pap smears or colon screening.  No     Visit Vitals  /82   Pulse 82   Temp 98 °F (36.7 °C) (Temporal)   Resp 20   Ht 5' 5\" (1.651 m)   Wt 198 lb (89.8 kg)   SpO2 99%   BMI 32.95 kg/m²

## 2021-11-17 NOTE — TELEPHONE ENCOUNTER
Received call from Josh Hay at [facility] with Red Flag Complaint. Brief description of triage: Left arm pain s/p fall. See below assessment. Triage indicates for patient to be seen by HCP within 4 hours or PCP triage. advised caller if unable to get an appointment in the suggested time frame to go to an [facility] or ED, caller agreeable. Care advice provided, patient verbalizes understanding; denies any other questions or concerns; instructed to call back for any new or worsening symptoms. Writer provided warm transfer to [facility] at [facility] for appointment scheduling. Attention Provider: Thank you for allowing me to participate in the care of your patient. The patient was connected to triage in response to information provided to the [facility]. Please do not respond through this encounter as the response is not directed to a shared pool. Reason for Disposition   [1] SEVERE pain AND [2] not improved 2 hours after pain medicine/ice packs    Answer Assessment - Initial Assessment Questions  1. MECHANISM: \"How did the injury happen? \"      Was going up stairs yesterday, turned around to go back downstairs and fell down 6 or 7 stairs. Unwitnessed fall, states she doesn't think she passed out but it took her a while to get up. Went to go to the emergency room but it was too busy and she left. Left hip pain, left arm, busted lip    2. ONSET: \"When did the injury happen? \" (Minutes or hours ago)       Yesterday 31 75 62.    3. LOCATION: \"Where is the injury located? \"       Left arm pain mostly from left elbow down to the wrist.   C/o left hip pain and busted lip as well states she has been able to bear weight and walk normally since but just is sore on her hip. 4. APPEARANCE of INJURY: \"What does the injury look like? \"       Swollen left forearm and swollen left fingers. 5. SEVERITY: \"Can you use the arm normally? \"       No due to pain    6. SWELLING or BRUISING: \"is there any swelling or bruising? \" If so, ask: \"How large is it? (e.g., inches, centimeters)       Swelling left forearm and left fingers. Denies discoloration or numbness/tingling, pain only. 7. PAIN: \"Is there pain? \" If so, ask: \"How bad is the pain? \"    (Scale 1-10; or mild, moderate, severe)      Most pain occurs with movement of left hand with supination, moderate pain with bending of elbow. 10/10 currently - states she took 3 Advil last night before she went to bed with minimal help for a little while    8. TETANUS: For any breaks in the skin, ask: \"When was the last tetanus booster? \"      Kavitha Bowers states she thinks 2-3 years ago. 9. OTHER SYMPTOMS: \"Do you have any other symptoms? \"  (e.g., numbness in hand)      Denies    10. PREGNANCY: \"Is there any chance you are pregnant? \" \"When was your last menstrual period? \"        Denies    Protocols used: ARM INJURY-ADULT-

## 2022-03-06 DIAGNOSIS — I10 ESSENTIAL (PRIMARY) HYPERTENSION: ICD-10-CM

## 2022-03-07 RX ORDER — AMLODIPINE BESYLATE 5 MG/1
TABLET ORAL
Qty: 90 TABLET | Refills: 0 | Status: SHIPPED | OUTPATIENT
Start: 2022-03-07 | End: 2022-08-30 | Stop reason: SDUPTHER

## 2022-03-07 RX ORDER — OLMESARTAN MEDOXOMIL 5 MG/1
TABLET ORAL
Qty: 90 TABLET | Refills: 0 | Status: SHIPPED | OUTPATIENT
Start: 2022-03-07 | End: 2022-06-07 | Stop reason: SDUPTHER

## 2022-08-02 RX ORDER — AMLODIPINE BESYLATE 5 MG/1
5 TABLET ORAL DAILY
Qty: 90 TABLET | Refills: 0 | OUTPATIENT
Start: 2022-08-02

## 2022-08-29 DIAGNOSIS — I10 ESSENTIAL (PRIMARY) HYPERTENSION: ICD-10-CM

## 2022-08-29 RX ORDER — OLMESARTAN MEDOXOMIL 5 MG/1
5 TABLET ORAL DAILY
Qty: 30 TABLET | Refills: 0 | Status: SHIPPED | OUTPATIENT
Start: 2022-08-29 | End: 2022-08-30 | Stop reason: SDUPTHER

## 2022-08-30 ENCOUNTER — TELEPHONE (OUTPATIENT)
Dept: INTERNAL MEDICINE CLINIC | Age: 57
End: 2022-08-30

## 2022-08-30 DIAGNOSIS — I10 ESSENTIAL (PRIMARY) HYPERTENSION: ICD-10-CM

## 2022-08-30 RX ORDER — AMLODIPINE BESYLATE 5 MG/1
5 TABLET ORAL DAILY
Qty: 90 TABLET | Refills: 0 | Status: SHIPPED | OUTPATIENT
Start: 2022-08-30 | End: 2022-09-14 | Stop reason: SDUPTHER

## 2022-08-30 RX ORDER — AMLODIPINE BESYLATE 5 MG/1
5 TABLET ORAL DAILY
Qty: 90 TABLET | Refills: 0 | Status: SHIPPED | OUTPATIENT
Start: 2022-08-30 | End: 2022-08-30 | Stop reason: SDUPTHER

## 2022-08-30 RX ORDER — OLMESARTAN MEDOXOMIL 5 MG/1
5 TABLET ORAL DAILY
Qty: 90 TABLET | Refills: 0 | Status: SHIPPED | OUTPATIENT
Start: 2022-08-30 | End: 2022-09-14

## 2022-08-30 NOTE — TELEPHONE ENCOUNTER
----- Message from Jeni Petersen sent at 8/29/2022  9:36 AM EDT -----  Subject: Refill Request    QUESTIONS  Name of Medication? amLODIPine (NORVASC) 5 mg tablet  Patient-reported dosage and instructions? 5 mg once day  How many days do you have left? 10  Preferred Pharmacy? [facility] #[id number]  Pharmacy phone number (if available)? [phone]  Additional Information for Provider? PT needs a 90 day supply insurance   wont pay for 30 day supply   ---------------------------------------------------------------------------  --------------,  Name of Medication? olmesartan (BENICAR) 5 mg tablet  Patient-reported dosage and instructions? 5 mg 2 in morning  How many days do you have left? 10  Preferred Pharmacy? [facility] #[id number]  Pharmacy phone number (if available)? [phone]  Additional Information for Provider? needs a 90 day supply. insurance wont   pay for 30.   ---------------------------------------------------------------------------  --------------  CALL BACK INFO  What is the best way for the office to contact you? OK to leave message on   voicemail  Preferred Call Back Phone Number? [phone]  ---------------------------------------------------------------------------  --------------  SCRIPT ANSWERS  Relationship to Patient?  Self

## 2022-09-14 ENCOUNTER — OFFICE VISIT (OUTPATIENT)
Dept: INTERNAL MEDICINE CLINIC | Age: 57
End: 2022-09-14
Payer: COMMERCIAL

## 2022-09-14 VITALS
HEART RATE: 68 BPM | BODY MASS INDEX: 32.22 KG/M2 | OXYGEN SATURATION: 99 % | RESPIRATION RATE: 16 BRPM | DIASTOLIC BLOOD PRESSURE: 100 MMHG | HEIGHT: 65 IN | SYSTOLIC BLOOD PRESSURE: 150 MMHG | WEIGHT: 193.4 LBS | TEMPERATURE: 98.2 F

## 2022-09-14 DIAGNOSIS — M25.512 CHRONIC LEFT SHOULDER PAIN: ICD-10-CM

## 2022-09-14 DIAGNOSIS — R23.2 HOT FLASHES: ICD-10-CM

## 2022-09-14 DIAGNOSIS — I10 ESSENTIAL HYPERTENSION: ICD-10-CM

## 2022-09-14 DIAGNOSIS — I10 ESSENTIAL (PRIMARY) HYPERTENSION: ICD-10-CM

## 2022-09-14 DIAGNOSIS — G89.29 CHRONIC LEFT SHOULDER PAIN: ICD-10-CM

## 2022-09-14 DIAGNOSIS — G89.29 CHRONIC HAND PAIN, RIGHT: ICD-10-CM

## 2022-09-14 DIAGNOSIS — N95.1 HOT FLASHES DUE TO MENOPAUSE: Primary | ICD-10-CM

## 2022-09-14 DIAGNOSIS — M79.641 CHRONIC HAND PAIN, RIGHT: ICD-10-CM

## 2022-09-14 PROCEDURE — 99214 OFFICE O/P EST MOD 30 MIN: CPT | Performed by: PHYSICIAN ASSISTANT

## 2022-09-14 RX ORDER — OLMESARTAN MEDOXOMIL 20 MG/1
20 TABLET ORAL DAILY
Qty: 30 TABLET | Refills: 1 | Status: SHIPPED | OUTPATIENT
Start: 2022-09-14 | End: 2022-10-11

## 2022-09-14 RX ORDER — AMLODIPINE BESYLATE 5 MG/1
5 TABLET ORAL DAILY
Qty: 90 TABLET | Refills: 0 | Status: SHIPPED | OUTPATIENT
Start: 2022-09-14

## 2022-09-14 RX ORDER — GABAPENTIN 300 MG/1
300 CAPSULE ORAL
Qty: 90 CAPSULE | Refills: 0 | Status: SHIPPED | OUTPATIENT
Start: 2022-09-14

## 2022-09-14 NOTE — PROGRESS NOTES
Reviewed record in preparation for visit and have obtained necessary documentation. Identified pt with two pt identifiers(name and ). Chief Complaint   Patient presents with    Medication Refill     There were no vitals filed for this visit. Health Maintenance Due   Topic Date Due    Hepatitis C Test  Never done    COVID-19 Vaccine (1) Never done    DTaP/Tdap/Td  (1 - Tdap) Never done    Shingles Vaccine (1 of 2) Never done    Mammogram  2022    Yearly Flu Vaccine (1) 2022       Ms. Jocelyne Maxwell has a reminder for a \"due or due soon\" health maintenance. I have asked that she discuss this further with her primary care provider for follow-up on this health maintenance. Coordination of Care Questionnaire:  :     1) Have you been to an emergency room, urgent care clinic since your last visit? no   Hospitalized since your last visit? no             2) Have you seen or consulted any other health care providers outside of [address] [place] [address] since your last visit? no  (Include any pap smears or colon screenings in this section.)    3. For patients aged 39-70: Has the patient had a colonoscopy / FIT/ Cologuard? Yes - no Care Gap present      If the patient is female:  4. For patients aged 41-77: Has the patient had a mammogram within the past 2 years? Yes - no Care Gap present       5. For patients aged 21-65: Has the patient had a pap smear? Yes - no Care Gap present      In the event something were to happen to you and you were unable to speak on your behalf, do you have an Advance Directive/ Living Will in place stating your wishes? NO    Do you have an Advance Directive on file? no    6) Are you interested in receiving information on Advance Directives? No      Patient is accompanied by self I have received verbal consent from Vianey Hawley to discuss any/all medical information while they are present in the room.

## 2022-09-14 NOTE — PROGRESS NOTES
Car Hernandez is a 49 y.o. female  Chief Complaint   Patient presents with    Medication Refill     Visit Vitals  BP (!) 185/101   Pulse 68   Temp 98.2 °F (36.8 °C) (Temporal)   Resp 16   Ht 5' 5\" (1.651 m)   Wt 193 lb 6.4 oz (87.7 kg)   SpO2 99%   BMI 32.18 kg/m²      Health Maintenance Due   Topic Date Due    Hepatitis C Screening  Never done    COVID-19 Vaccine (1) Never done    DTaP/Tdap/Td series (1 - Tdap) Never done    Shingrix Vaccine Age 50> (1 of 2) Never done    Breast Cancer Screen Mammogram  01/04/2022    Flu Vaccine (1) 09/01/2022       HPI  Former patient of Jesus Srivastava in to see me today to Establish Care. Needs Neurontin refill for PRN hot flashes and insomnia. Last filled 2020    L arm fx in 11/2021. Saw ortho, PT. ROM is impaired and shoulder is painful. HTN Follow up - BP un controlled:  BP Readings from Last 3 Encounters:   09/14/22 (!) 185/101   11/17/21 127/82   06/04/21 136/84     Currently taking half tab of Amlodipine 5 mg per day due to ankle swelling and two olmesartan 5 mg per day. Key CAD CHF Meds               amLODIPine (NORVASC) 5 mg tablet (Taking) Take 1 Tablet by mouth daily. olmesartan (BENICAR) 5 mg tablet (Taking) Take 1 Tablet by mouth daily. Lab Results   Component Value Date/Time    Creatinine 0.67 06/04/2021 08:50 AM       ROS  Review of Systems   Respiratory:  Negative for shortness of breath. Cardiovascular:  Negative for chest pain and palpitations. Neurological:  Negative for dizziness, loss of consciousness and weakness. EXAM  Physical Exam  Vitals and nursing note reviewed. Constitutional:       General: She is not in acute distress. Appearance: She is well-developed. HENT:      Head: Normocephalic and atraumatic. Neck:      Vascular: No JVD. Cardiovascular:      Rate and Rhythm: Normal rate and regular rhythm. Heart sounds: Normal heart sounds.    Pulmonary:      Effort: Pulmonary effort is normal. No respiratory distress. Breath sounds: Normal breath sounds. Musculoskeletal:      Cervical back: Neck supple. Skin:     General: Skin is warm and dry. Neurological:      Mental Status: She is alert and oriented to person, place, and time. Psychiatric:         Mood and Affect: Mood normal.         Behavior: Behavior normal.         Thought Content: Thought content normal.         Judgment: Judgment normal.     ASSESSMENT/PLAN  Encounter Diagnoses     ICD-10-CM ICD-9-CM   1. Hot flashes due to menopause  N95.1 627.2   2. Essential (primary) hypertension  I10 401.9   3. Hot flashes  R23.2 782.62   4. Essential hypertension  I10 401.9   5. Chronic left shoulder pain  M25.512 719.41    G89.29 338.29   6.  Chronic hand pain, right  M79.641 729.5    G89.29 338.29     Orders Placed This Encounter    REFERRAL TO ORTHOPEDIC SURGERY    REFERRAL TO HAND SURGERY    Refill: amLODIPine (NORVASC) 5 mg tablet    Start: olmesartan (BENICAR) 20 mg tablet    Refill: gabapentin (NEURONTIN) 300 mg capsule

## 2022-09-27 ENCOUNTER — TELEPHONE (OUTPATIENT)
Dept: INTERNAL MEDICINE CLINIC | Age: 57
End: 2022-09-27

## 2022-09-27 ENCOUNTER — OFFICE VISIT (OUTPATIENT)
Dept: INTERNAL MEDICINE CLINIC | Age: 57
End: 2022-09-27
Payer: COMMERCIAL

## 2022-09-27 VITALS
HEART RATE: 69 BPM | BODY MASS INDEX: 32.19 KG/M2 | WEIGHT: 193.2 LBS | SYSTOLIC BLOOD PRESSURE: 139 MMHG | OXYGEN SATURATION: 100 % | HEIGHT: 65 IN | DIASTOLIC BLOOD PRESSURE: 82 MMHG | RESPIRATION RATE: 18 BRPM | TEMPERATURE: 98.2 F

## 2022-09-27 DIAGNOSIS — N76.0 ACUTE VAGINITIS: ICD-10-CM

## 2022-09-27 DIAGNOSIS — R35.0 URINARY FREQUENCY: ICD-10-CM

## 2022-09-27 DIAGNOSIS — Z12.31 ENCOUNTER FOR SCREENING MAMMOGRAM FOR MALIGNANT NEOPLASM OF BREAST: ICD-10-CM

## 2022-09-27 DIAGNOSIS — I10 ESSENTIAL (PRIMARY) HYPERTENSION: Primary | ICD-10-CM

## 2022-09-27 LAB
APPEARANCE UR: ABNORMAL
BACTERIA URNS QL MICRO: ABNORMAL /HPF
BILIRUB UR QL: NEGATIVE
COLOR UR: ABNORMAL
EPITH CASTS URNS QL MICRO: ABNORMAL /LPF
GLUCOSE UR STRIP.AUTO-MCNC: NEGATIVE MG/DL
HGB UR QL STRIP: NEGATIVE
HYALINE CASTS URNS QL MICRO: ABNORMAL /LPF (ref 0–5)
KETONES UR QL STRIP.AUTO: NEGATIVE MG/DL
LEUKOCYTE ESTERASE UR QL STRIP.AUTO: ABNORMAL
NITRITE UR QL STRIP.AUTO: NEGATIVE
PH UR STRIP: 5.5 [PH] (ref 5–8)
PROT UR STRIP-MCNC: NEGATIVE MG/DL
RBC #/AREA URNS HPF: ABNORMAL /HPF (ref 0–5)
SP GR UR REFRACTOMETRY: 1.02 (ref 1–1.03)
UA: UC IF INDICATED,UAUC: ABNORMAL
UROBILINOGEN UR QL STRIP.AUTO: 0.2 EU/DL (ref 0.2–1)
WBC URNS QL MICRO: ABNORMAL /HPF (ref 0–4)

## 2022-09-27 PROCEDURE — 99214 OFFICE O/P EST MOD 30 MIN: CPT | Performed by: PHYSICIAN ASSISTANT

## 2022-09-27 RX ORDER — NITROFURANTOIN 25; 75 MG/1; MG/1
100 CAPSULE ORAL 2 TIMES DAILY
Qty: 14 CAPSULE | Refills: 0 | Status: SHIPPED | OUTPATIENT
Start: 2022-09-27 | End: 2022-10-04

## 2022-09-27 RX ORDER — FLUCONAZOLE 150 MG/1
150 TABLET ORAL
Qty: 2 TABLET | Refills: 0 | Status: SHIPPED | OUTPATIENT
Start: 2022-09-27 | End: 2022-10-05

## 2022-09-27 NOTE — PROGRESS NOTES
Reviewed record in preparation for visit and have obtained necessary documentation. Identified pt with two pt identifiers(name and ). Chief Complaint   Patient presents with    Follow-up     Blood pressure     Blood pressure 139/82, pulse 69, temperature 98.2 °F (36.8 °C), temperature source Temporal, resp. rate 18, height 5' 5\" (1.651 m), weight 193 lb 3.2 oz (87.6 kg), SpO2 100 %. Health Maintenance Due   Topic Date Due    Hepatitis C Test  Never done    COVID-19 Vaccine (1) Never done    DTaP/Tdap/Td  (1 - Tdap) Never done    Shingles Vaccine (1 of 2) Never done    Mammogram  2022    Yearly Flu Vaccine (1) 2022       Ms. Michele Hay has a reminder for a \"due or due soon\" health maintenance. I have asked that she discuss this further with her primary care provider for follow-up on this health maintenance. Coordination of Care Questionnaire:  :     1) Have you been to an emergency room, urgent care clinic since your last visit? no   Hospitalized since your last visit? no             2) Have you seen or consulted any other health care providers outside of [address] [place] [address] since your last visit? no  (Include any pap smears or colon screenings in this section.)    3) In the event something were to happen to you and you were unable to speak on your behalf, do you have an Advance Directive/ Living Will in place stating your wishes?  NO

## 2022-09-27 NOTE — PROGRESS NOTES
Charly Heredia is a 49 y.o. female  Chief Complaint   Patient presents with    Follow-up     Blood pressure     Visit Vitals  /82 (BP 1 Location: Right upper arm, BP Patient Position: Sitting, BP Cuff Size: Adult)   Pulse 69   Temp 98.2 °F (36.8 °C) (Temporal)   Resp 18   Ht 5' 5\" (1.651 m)   Wt 193 lb 3.2 oz (87.6 kg)   SpO2 100%   BMI 32.15 kg/m²      Health Maintenance Due   Topic Date Due    Hepatitis C Screening  Never done    COVID-19 Vaccine (1) Never done    DTaP/Tdap/Td series (1 - Tdap) Never done    Shingrix Vaccine Age 50> (1 of 2) Never done    Breast Cancer Screen Mammogram  01/04/2022    Flu Vaccine (1) 08/01/2022     HPI  HTN Follow up - BP controlled:  BP Readings from Last 3 Encounters:   09/27/22 139/82   09/14/22 (!) 150/100   11/17/21 127/82     Currently taking:   Key CAD CHF Meds               amLODIPine (NORVASC) 5 mg tablet (Taking) Take 1 Tablet by mouth daily. olmesartan (BENICAR) 20 mg tablet (Taking) Take 1 Tablet by mouth daily. Lab Results   Component Value Date/Time    Creatinine 0.67 06/04/2021 08:50 AM     At the end of today's short follow up visit, pt notes she has been having vaginal itching  x over 1 week. OTC helped some but not entirely. Having urinary frequency x months    ROS  Review of Systems   Respiratory:  Negative for shortness of breath. Cardiovascular:  Negative for chest pain and palpitations. Neurological:  Negative for dizziness, loss of consciousness and weakness. EXAM  Physical Exam  Vitals and nursing note reviewed. Constitutional:       General: She is not in acute distress. Appearance: She is well-developed. HENT:      Head: Normocephalic and atraumatic. Neck:      Vascular: No JVD. Cardiovascular:      Rate and Rhythm: Normal rate and regular rhythm. Heart sounds: Normal heart sounds. Pulmonary:      Effort: Pulmonary effort is normal. No respiratory distress. Breath sounds: Normal breath sounds. Musculoskeletal:      Cervical back: Neck supple. Skin:     General: Skin is warm and dry. Neurological:      Mental Status: She is alert and oriented to person, place, and time. Psychiatric:         Mood and Affect: Mood normal.         Behavior: Behavior normal.         Thought Content: Thought content normal.         Judgment: Judgment normal.     ASSESSMENT/PLAN  Encounter Diagnoses     ICD-10-CM ICD-9-CM   1. Essential (primary) hypertension  I10 401.9   2. Urinary frequency  R35.0 788.41   3. Acute vaginitis  N76.0 616.10   4.  Encounter for screening mammogram for malignant neoplasm of breast  Z12.31 V76.12     Orders Placed This Encounter    [facility] MAMMO BI SCREENING INCL CAD    URINALYSIS W/ REFLEX CULTURE    fluconazole (DIFLUCAN) 150 mg tablet     INI after Diflucan x 1 week, then schedule follow up with vaginal exam.

## 2022-09-28 NOTE — TELEPHONE ENCOUNTER
Called Patient and Left a detailed voice message for callback.  Time: 11:03 AM, 09/28/22        Signed By: Ethan Osei     September 28, 2022

## 2022-10-08 DIAGNOSIS — I10 ESSENTIAL (PRIMARY) HYPERTENSION: ICD-10-CM

## 2022-10-11 RX ORDER — OLMESARTAN MEDOXOMIL 20 MG/1
TABLET ORAL
Qty: 30 TABLET | Refills: 5 | Status: SHIPPED | OUTPATIENT
Start: 2022-10-11

## 2022-11-27 DIAGNOSIS — I10 ESSENTIAL (PRIMARY) HYPERTENSION: ICD-10-CM

## 2022-11-28 RX ORDER — OLMESARTAN MEDOXOMIL 5 MG/1
TABLET ORAL
Qty: 90 TABLET | Refills: 0 | OUTPATIENT
Start: 2022-11-28

## 2023-01-03 ENCOUNTER — OFFICE VISIT (OUTPATIENT)
Dept: INTERNAL MEDICINE CLINIC | Age: 58
End: 2023-01-03
Payer: COMMERCIAL

## 2023-01-03 VITALS
RESPIRATION RATE: 14 BRPM | TEMPERATURE: 98.1 F | HEIGHT: 65 IN | SYSTOLIC BLOOD PRESSURE: 127 MMHG | BODY MASS INDEX: 32.49 KG/M2 | OXYGEN SATURATION: 99 % | HEART RATE: 70 BPM | WEIGHT: 195 LBS | DIASTOLIC BLOOD PRESSURE: 74 MMHG

## 2023-01-03 DIAGNOSIS — S29.012A STRAIN OF THORACIC PARASPINAL MUSCLES EXCLUDING T1 AND T2 LEVELS, INITIAL ENCOUNTER: Primary | ICD-10-CM

## 2023-01-03 DIAGNOSIS — M62.830 MUSCLE SPASM OF BACK: ICD-10-CM

## 2023-01-03 PROCEDURE — 99213 OFFICE O/P EST LOW 20 MIN: CPT | Performed by: NURSE PRACTITIONER

## 2023-01-03 RX ORDER — TIZANIDINE 4 MG/1
4 TABLET ORAL
Qty: 10 TABLET | Refills: 0 | Status: SHIPPED | OUTPATIENT
Start: 2023-01-03

## 2023-01-03 RX ORDER — DICLOFENAC SODIUM 75 MG/1
75 TABLET, DELAYED RELEASE ORAL 2 TIMES DAILY
Qty: 14 TABLET | Refills: 0 | Status: SHIPPED | OUTPATIENT
Start: 2023-01-03 | End: 2023-01-10

## 2023-01-03 NOTE — PROGRESS NOTES
HISTORY OF PRESENT ILLNESS  Librado Man is a 56 y.o. female. Patient is here with back pain x 7 days. Went to stand up. Left upper back. Excruciating. Last week , trouble breath and sneeze. Pain is constant. Now a 8/10. Shoot /  pressure  Lying down makes worse. Works as a   Taking tylenol/ neurontin. No previous injury or surgery. No  numbness/ tingling   History. Scoliosis. Past Medical History:   Diagnosis Date    Back pain     Constipation     intermttent    Hypertension      Past Surgical History:   Procedure Laterality Date    HX PARTIAL HYSTERECTOMY  2012       Review of Systems   Constitutional:  Negative for chills, fever and malaise/fatigue. Respiratory:  Negative for shortness of breath. Cardiovascular:  Negative for chest pain. Gastrointestinal:  Negative for abdominal pain, nausea and vomiting. Musculoskeletal:  Positive for back pain and myalgias. Negative for falls, joint pain and neck pain. Neurological:  Negative for dizziness, tingling, sensory change, focal weakness and headaches. Physical Exam  Musculoskeletal:      Thoracic back: Spasms and tenderness present. No bony tenderness. Normal range of motion. Back:       Comments: Tenderness and spasm left thoracic. ASSESSMENT and PLAN    ICD-10-CM ICD-9-CM    1. Strain of thoracic paraspinal muscles excluding T1 and T2 levels, initial encounter  S29.012A 847.1       2. Muscle spasm of back  M62.830 724.8         Encounter Diagnoses   Name Primary? Strain of thoracic paraspinal muscles excluding T1 and T2 levels, initial encounter Yes    Muscle spasm of back      Orders Placed This Encounter    tiZANidine (ZANAFLEX) 4 mg tablet    diclofenac EC (VOLTAREN) 75 mg EC tablet   Gentle stretching  Massage  Ice or heat  Medications as directed  Take with food. Complete entire course of prescription. Follow plan of care as discussed.   Note for work  Signed By: MIQUEL Reynaga January 3, 2023

## 2023-01-03 NOTE — LETTER
NOTIFICATION RETURN TO WORK / SCHOOL    1/3/2023 10:21 AM    Ms. Marleni Alberto  [facility] [phone]      To Whom It May Concern:    Marleni Alberto is currently under the care of Veronica Sanon.. She will return to workl on: 1/5/23    If there are questions or concerns please have the patient contact our office.         Sincerely,      MIQUEL Fam

## 2023-02-20 DIAGNOSIS — Z12.31 ENCOUNTER FOR SCREENING MAMMOGRAM FOR MALIGNANT NEOPLASM OF BREAST: ICD-10-CM

## 2023-03-14 ENCOUNTER — OFFICE VISIT (OUTPATIENT)
Dept: INTERNAL MEDICINE CLINIC | Age: 58
End: 2023-03-14
Payer: COMMERCIAL

## 2023-03-14 VITALS
WEIGHT: 194 LBS | HEART RATE: 82 BPM | DIASTOLIC BLOOD PRESSURE: 86 MMHG | BODY MASS INDEX: 32.32 KG/M2 | SYSTOLIC BLOOD PRESSURE: 135 MMHG | OXYGEN SATURATION: 98 % | HEIGHT: 65 IN | RESPIRATION RATE: 20 BRPM | TEMPERATURE: 98.2 F

## 2023-03-14 DIAGNOSIS — Z11.59 ENCOUNTER FOR HEPATITIS C SCREENING TEST FOR LOW RISK PATIENT: ICD-10-CM

## 2023-03-14 DIAGNOSIS — I10 ESSENTIAL (PRIMARY) HYPERTENSION: Primary | ICD-10-CM

## 2023-03-14 DIAGNOSIS — Z13.220 SCREENING CHOLESTEROL LEVEL: ICD-10-CM

## 2023-03-14 PROCEDURE — 99214 OFFICE O/P EST MOD 30 MIN: CPT | Performed by: PHYSICIAN ASSISTANT

## 2023-03-14 NOTE — PROGRESS NOTES
Patient here to follow up BP. No chief complaint on file. Vitals:    03/14/23 1510   Temp: 98.2 °F (36.8 °C)   TempSrc: Temporal   Weight: 194 lb (88 kg)   PainSc:   0 - No pain       Current Outpatient Medications on File Prior to Visit   Medication Sig Dispense Refill    amLODIPine (NORVASC) 5 mg tablet TAKE 1 TABLET BY MOUTH EVERY DAY 90 Tablet 0    olmesartan (BENICAR) 20 mg tablet TAKE 1 TABLET BY MOUTH EVERY DAY 30 Tablet 5    gabapentin (NEURONTIN) 300 mg capsule Take 1 Capsule by mouth nightly as needed (hot flashes or insomnia). Max Daily Amount: 300 mg. 90 Capsule 0    fluticasone propionate (FLONASE) 50 mcg/actuation nasal spray SPRAY 2 SPRAYS INTO EACH NOSTRIL EVERY DAY (Patient taking differently: daily as needed. SPRAY 2 SPRAYS INTO EACH NOSTRIL EVERY DAY) 1 Bottle 1    [DISCONTINUED] tiZANidine (ZANAFLEX) 4 mg tablet Take 1 Tablet by mouth nightly as needed for Muscle Spasm(s). 10 Tablet 0     No current facility-administered medications on file prior to visit. Health Maintenance Due   Topic    Hepatitis C Screening     COVID-19 Vaccine (1)    DTaP/Tdap/Td series (1 - Tdap)    Cervical cancer screen     Shingles Vaccine (1 of 2)    Flu Vaccine (1)       1. \"Have you been to the ER, urgent care clinic since your last visit? Hospitalized since your last visit? \" No    2. \"Have you seen or consulted any other health care providers outside of the [address] [place] [address] since your last visit? \" No     3. For patients aged 39-70: Has the patient had a colonoscopy / FIT/ Cologuard? Yes - no Care Gap present      If the patient is female:    4. For patients aged 41-77: Has the patient had a mammogram within the past 2 years? Yes - no Care Gap present      5. For patients aged 21-65: Has the patient had a pap smear?  No

## 2023-03-14 NOTE — PROGRESS NOTES
Simon Butler is a 56 y.o. female  Chief Complaint   Patient presents with    Hypertension     Visit Vitals  /86 (BP 1 Location: Left upper arm, BP Patient Position: Sitting, BP Cuff Size: Adult)   Pulse 82   Temp 98.2 °F (36.8 °C) (Temporal)   Resp 20   Ht 5' 5\" (1.651 m)   Wt 194 lb (88 kg)   SpO2 98%   BMI 32.28 kg/m²      Health Maintenance Due   Topic Date Due    Hepatitis C Screening  Never done    COVID-19 Vaccine (1) Never done    DTaP/Tdap/Td series (1 - Tdap) Never done    Cervical cancer screen  Never done    Shingles Vaccine (1 of 2) Never done    Flu Vaccine (1) 08/01/2022       HPI  HTN Follow up - BP controlled:  BP Readings from Last 3 Encounters:   03/14/23 135/86   01/03/23 127/74   09/27/22 139/82     Currently taking:   Key CAD CHF Meds               amLODIPine (NORVASC) 5 mg tablet (Taking) TAKE 1 TABLET BY MOUTH EVERY DAY    olmesartan (BENICAR) 20 mg tablet (Taking) TAKE 1 TABLET BY MOUTH EVERY DAY          Lab Results   Component Value Date/Time    Creatinine 0.67 06/04/2021 08:50 AM     Wt Readings from Last 9 Encounters:   03/14/23 194 lb (88 kg)   01/03/23 195 lb (88.5 kg)   09/27/22 193 lb 3.2 oz (87.6 kg)   09/14/22 193 lb 6.4 oz (87.7 kg)   11/17/21 198 lb (89.8 kg)   06/04/21 201 lb (91.2 kg)   04/14/20 182 lb (82.6 kg)   12/24/19 181 lb (82.1 kg)   12/16/19 184 lb (83.5 kg)     ROS  Review of Systems   Respiratory:  Negative for shortness of breath. Cardiovascular:  Negative for chest pain and palpitations. Neurological:  Negative for dizziness, loss of consciousness and weakness. EXAM  Physical Exam  Vitals and nursing note reviewed. Constitutional:       General: She is not in acute distress. Appearance: She is well-developed. HENT:      Head: Normocephalic and atraumatic. Neck:      Vascular: No JVD. Cardiovascular:      Rate and Rhythm: Normal rate and regular rhythm. Heart sounds: Normal heart sounds.    Pulmonary:      Effort: Pulmonary effort is normal. No respiratory distress. Breath sounds: Normal breath sounds. Musculoskeletal:      Cervical back: Neck supple. Skin:     General: Skin is warm and dry. Neurological:      Mental Status: She is alert and oriented to person, place, and time. Psychiatric:         Mood and Affect: Mood normal.         Behavior: Behavior normal.         Thought Content: Thought content normal.         Judgment: Judgment normal.     ASSESSMENT/PLAN  Encounter Diagnoses     ICD-10-CM ICD-9-CM   1. Essential (primary) hypertension  I10 401.9   2. Encounter for hepatitis C screening test for low risk patient  Z11.59 V73.89   3.  Screening cholesterol level  Z13.220 V77.91     Orders Placed This Encounter    HEPATITIS C AB    METABOLIC PANEL, COMPREHENSIVE    LIPID PANEL

## 2023-03-16 DIAGNOSIS — Z13.220 SCREENING CHOLESTEROL LEVEL: ICD-10-CM

## 2023-03-16 DIAGNOSIS — Z11.59 ENCOUNTER FOR HEPATITIS C SCREENING TEST FOR LOW RISK PATIENT: ICD-10-CM

## 2023-03-16 DIAGNOSIS — I10 ESSENTIAL (PRIMARY) HYPERTENSION: ICD-10-CM

## 2023-03-17 LAB
ALBUMIN SERPL-MCNC: 3.6 G/DL (ref 3.5–5)
ALBUMIN/GLOB SERPL: 0.9 (ref 1.1–2.2)
ALP SERPL-CCNC: 126 U/L (ref 45–117)
ALT SERPL-CCNC: 22 U/L (ref 12–78)
ANION GAP SERPL CALC-SCNC: 2 MMOL/L (ref 5–15)
AST SERPL-CCNC: 19 U/L (ref 15–37)
BILIRUB SERPL-MCNC: 0.4 MG/DL (ref 0.2–1)
BUN SERPL-MCNC: 12 MG/DL (ref 6–20)
BUN/CREAT SERPL: 17 (ref 12–20)
CALCIUM SERPL-MCNC: 9.6 MG/DL (ref 8.5–10.1)
CHLORIDE SERPL-SCNC: 110 MMOL/L (ref 97–108)
CHOLEST SERPL-MCNC: 144 MG/DL
CO2 SERPL-SCNC: 28 MMOL/L (ref 21–32)
CREAT SERPL-MCNC: 0.72 MG/DL (ref 0.55–1.02)
GLOBULIN SER CALC-MCNC: 3.8 G/DL (ref 2–4)
GLUCOSE SERPL-MCNC: 95 MG/DL (ref 65–100)
HCV AB SERPL QL IA: NONREACTIVE
HDLC SERPL-MCNC: 66 MG/DL
HDLC SERPL: 2.2 (ref 0–5)
LDLC SERPL CALC-MCNC: 63.4 MG/DL (ref 0–100)
POTASSIUM SERPL-SCNC: 3.7 MMOL/L (ref 3.5–5.1)
PROT SERPL-MCNC: 7.4 G/DL (ref 6.4–8.2)
SODIUM SERPL-SCNC: 140 MMOL/L (ref 136–145)
TRIGL SERPL-MCNC: 73 MG/DL (ref ?–150)
VLDLC SERPL CALC-MCNC: 14.6 MG/DL

## 2023-03-17 NOTE — PROGRESS NOTES
Liver enzymes, kidney function, cholesterol, and electrolytes are all normal/acceptable. Hepatitis C test is negative (normal). Good!

## 2023-05-02 DIAGNOSIS — I10 ESSENTIAL (PRIMARY) HYPERTENSION: ICD-10-CM

## 2023-05-02 RX ORDER — OLMESARTAN MEDOXOMIL 20 MG/1
TABLET ORAL
Qty: 90 TABLET | Refills: 1 | Status: SHIPPED | OUTPATIENT
Start: 2023-05-02

## 2023-08-03 DIAGNOSIS — I10 ESSENTIAL (PRIMARY) HYPERTENSION: ICD-10-CM

## 2023-08-04 RX ORDER — AMLODIPINE BESYLATE 5 MG/1
TABLET ORAL
Qty: 90 TABLET | Refills: 0 | Status: SHIPPED | OUTPATIENT
Start: 2023-08-04

## 2023-11-02 DIAGNOSIS — I10 ESSENTIAL (PRIMARY) HYPERTENSION: ICD-10-CM

## 2023-11-02 NOTE — TELEPHONE ENCOUNTER
Refill request received from [facility] for   Requested Prescriptions     Pending Prescriptions Disp Refills    olmesartan (BENICAR) 20 MG tablet [Pharmacy Med Name: OLMESARTAN MEDOXOMIL 20 MG TAB] 90 tablet 1     Sig: TAKE 1 TABLET BY MOUTH EVERY DAY     3/14/2023   Visit date not found     Routed to CANDY Melton for review.

## 2023-11-03 DIAGNOSIS — I10 ESSENTIAL (PRIMARY) HYPERTENSION: ICD-10-CM

## 2023-11-03 RX ORDER — OLMESARTAN MEDOXOMIL 20 MG/1
TABLET ORAL
Qty: 90 TABLET | Refills: 0 | Status: SHIPPED | OUTPATIENT
Start: 2023-11-03

## 2023-11-03 RX ORDER — AMLODIPINE BESYLATE 5 MG/1
TABLET ORAL
Qty: 90 TABLET | Refills: 0 | Status: SHIPPED | OUTPATIENT
Start: 2023-11-03 | End: 2023-11-21 | Stop reason: SDUPTHER

## 2023-11-20 SDOH — ECONOMIC STABILITY: FOOD INSECURITY: WITHIN THE PAST 12 MONTHS, YOU WORRIED THAT YOUR FOOD WOULD RUN OUT BEFORE YOU GOT MONEY TO BUY MORE.: SOMETIMES TRUE

## 2023-11-20 SDOH — ECONOMIC STABILITY: INCOME INSECURITY: HOW HARD IS IT FOR YOU TO PAY FOR THE VERY BASICS LIKE FOOD, HOUSING, MEDICAL CARE, AND HEATING?: SOMEWHAT HARD

## 2023-11-20 SDOH — ECONOMIC STABILITY: TRANSPORTATION INSECURITY
IN THE PAST 12 MONTHS, HAS LACK OF TRANSPORTATION KEPT YOU FROM MEETINGS, WORK, OR FROM GETTING THINGS NEEDED FOR DAILY LIVING?: NO

## 2023-11-20 SDOH — ECONOMIC STABILITY: HOUSING INSECURITY
IN THE LAST 12 MONTHS, WAS THERE A TIME WHEN YOU DID NOT HAVE A STEADY PLACE TO SLEEP OR SLEPT IN A SHELTER (INCLUDING NOW)?: NO

## 2023-11-20 SDOH — ECONOMIC STABILITY: FOOD INSECURITY: WITHIN THE PAST 12 MONTHS, THE FOOD YOU BOUGHT JUST DIDN'T LAST AND YOU DIDN'T HAVE MONEY TO GET MORE.: SOMETIMES TRUE

## 2023-11-21 ENCOUNTER — OFFICE VISIT (OUTPATIENT)
Age: 58
End: 2023-11-21
Payer: COMMERCIAL

## 2023-11-21 VITALS
HEIGHT: 65 IN | BODY MASS INDEX: 33.32 KG/M2 | RESPIRATION RATE: 20 BRPM | OXYGEN SATURATION: 99 % | TEMPERATURE: 97 F | SYSTOLIC BLOOD PRESSURE: 116 MMHG | DIASTOLIC BLOOD PRESSURE: 75 MMHG | HEART RATE: 71 BPM | WEIGHT: 200 LBS

## 2023-11-21 DIAGNOSIS — J30.9 ALLERGIC RHINITIS, UNSPECIFIED SEASONALITY, UNSPECIFIED TRIGGER: ICD-10-CM

## 2023-11-21 DIAGNOSIS — Z00.00 ANNUAL PHYSICAL EXAM: ICD-10-CM

## 2023-11-21 DIAGNOSIS — Z00.00 ANNUAL PHYSICAL EXAM: Primary | ICD-10-CM

## 2023-11-21 DIAGNOSIS — I10 ESSENTIAL (PRIMARY) HYPERTENSION: ICD-10-CM

## 2023-11-21 DIAGNOSIS — N95.1 MENOPAUSAL AND FEMALE CLIMACTERIC STATES: ICD-10-CM

## 2023-11-21 LAB
ALBUMIN SERPL-MCNC: 3.5 G/DL (ref 3.5–5)
ALBUMIN/GLOB SERPL: 0.9 (ref 1.1–2.2)
ALP SERPL-CCNC: 128 U/L (ref 45–117)
ALT SERPL-CCNC: 20 U/L (ref 12–78)
ANION GAP SERPL CALC-SCNC: 5 MMOL/L (ref 5–15)
AST SERPL-CCNC: 14 U/L (ref 15–37)
BILIRUB SERPL-MCNC: 0.3 MG/DL (ref 0.2–1)
BUN SERPL-MCNC: 15 MG/DL (ref 6–20)
BUN/CREAT SERPL: 19 (ref 12–20)
CALCIUM SERPL-MCNC: 9.4 MG/DL (ref 8.5–10.1)
CHLORIDE SERPL-SCNC: 109 MMOL/L (ref 97–108)
CHOLEST SERPL-MCNC: 154 MG/DL
CO2 SERPL-SCNC: 28 MMOL/L (ref 21–32)
CREAT SERPL-MCNC: 0.77 MG/DL (ref 0.55–1.02)
ERYTHROCYTE [DISTWIDTH] IN BLOOD BY AUTOMATED COUNT: 14 % (ref 11.5–14.5)
EST. AVERAGE GLUCOSE BLD GHB EST-MCNC: 123 MG/DL
GLOBULIN SER CALC-MCNC: 3.9 G/DL (ref 2–4)
GLUCOSE SERPL-MCNC: 94 MG/DL (ref 65–100)
HBA1C MFR BLD: 5.9 % (ref 4–5.6)
HCT VFR BLD AUTO: 40.6 % (ref 35–47)
HDLC SERPL-MCNC: 71 MG/DL
HDLC SERPL: 2.2 (ref 0–5)
HGB BLD-MCNC: 12.9 G/DL (ref 11.5–16)
LDLC SERPL CALC-MCNC: 68.6 MG/DL (ref 0–100)
MCH RBC QN AUTO: 27.7 PG (ref 26–34)
MCHC RBC AUTO-ENTMCNC: 31.8 G/DL (ref 30–36.5)
MCV RBC AUTO: 87.1 FL (ref 80–99)
NRBC # BLD: 0 K/UL (ref 0–0.01)
NRBC BLD-RTO: 0 PER 100 WBC
PLATELET # BLD AUTO: 235 K/UL (ref 150–400)
PMV BLD AUTO: 11.3 FL (ref 8.9–12.9)
POTASSIUM SERPL-SCNC: 4.1 MMOL/L (ref 3.5–5.1)
PROT SERPL-MCNC: 7.4 G/DL (ref 6.4–8.2)
RBC # BLD AUTO: 4.66 M/UL (ref 3.8–5.2)
SODIUM SERPL-SCNC: 142 MMOL/L (ref 136–145)
TRIGL SERPL-MCNC: 72 MG/DL
VLDLC SERPL CALC-MCNC: 14.4 MG/DL
WBC # BLD AUTO: 6.6 K/UL (ref 3.6–11)

## 2023-11-21 PROCEDURE — 99214 OFFICE O/P EST MOD 30 MIN: CPT | Performed by: PHYSICIAN ASSISTANT

## 2023-11-21 PROCEDURE — 99396 PREV VISIT EST AGE 40-64: CPT | Performed by: PHYSICIAN ASSISTANT

## 2023-11-21 PROCEDURE — 3078F DIAST BP <80 MM HG: CPT | Performed by: PHYSICIAN ASSISTANT

## 2023-11-21 PROCEDURE — 3074F SYST BP LT 130 MM HG: CPT | Performed by: PHYSICIAN ASSISTANT

## 2023-11-21 RX ORDER — AMLODIPINE BESYLATE 5 MG/1
5 TABLET ORAL DAILY
Qty: 90 TABLET | Refills: 1 | Status: SHIPPED | OUTPATIENT
Start: 2023-11-21

## 2023-11-21 RX ORDER — FLUTICASONE PROPIONATE 50 MCG
2 SPRAY, SUSPENSION (ML) NASAL DAILY
Qty: 3 EACH | Refills: 3 | Status: SHIPPED | OUTPATIENT
Start: 2023-11-21

## 2023-11-21 RX ORDER — OLMESARTAN MEDOXOMIL 20 MG/1
20 TABLET ORAL DAILY
Qty: 90 TABLET | Refills: 1 | Status: SHIPPED | OUTPATIENT
Start: 2023-11-21

## 2023-11-21 RX ORDER — GABAPENTIN 400 MG/1
400 CAPSULE ORAL
Qty: 90 CAPSULE | Refills: 1 | Status: SHIPPED | OUTPATIENT
Start: 2023-11-21 | End: 2024-05-19

## 2023-11-21 ASSESSMENT — ENCOUNTER SYMPTOMS
COUGH: 0
DIARRHEA: 0
BACK PAIN: 0
EYE PAIN: 0
SINUS PAIN: 0
VOMITING: 0
ABDOMINAL PAIN: 0
NAUSEA: 0
CONSTIPATION: 0
SHORTNESS OF BREATH: 0
BLOOD IN STOOL: 0
SINUS PRESSURE: 0
SORE THROAT: 0

## 2023-11-23 LAB — TSH SERPL DL<=0.05 MIU/L-ACNC: 1.13 UIU/ML (ref 0.45–4.5)

## 2023-11-24 PROBLEM — R73.09 ELEVATED HEMOGLOBIN A1C: Status: ACTIVE | Noted: 2023-11-24

## 2023-11-24 PROBLEM — R73.03 PREDIABETES: Status: ACTIVE | Noted: 2023-11-24

## 2023-11-24 PROBLEM — E66.9 OBESITY (BMI 30-39.9): Status: ACTIVE | Noted: 2023-11-24

## 2024-01-12 ENCOUNTER — TELEPHONE (OUTPATIENT)
Age: 59
End: 2024-01-12

## 2024-01-12 NOTE — TELEPHONE ENCOUNTER
Called patient and lvm.  Patient needs to rs appt due to appt on 5/21/24 was cancelled. Provider will be out of the office.

## 2024-04-22 ENCOUNTER — TELEMEDICINE (OUTPATIENT)
Age: 59
End: 2024-04-22
Payer: COMMERCIAL

## 2024-04-22 DIAGNOSIS — N76.0 ACUTE VAGINITIS: ICD-10-CM

## 2024-04-22 DIAGNOSIS — N76.0 ACUTE VAGINITIS: Primary | ICD-10-CM

## 2024-04-22 DIAGNOSIS — N30.00 ACUTE CYSTITIS WITHOUT HEMATURIA: Primary | ICD-10-CM

## 2024-04-22 LAB
APPEARANCE UR: ABNORMAL
BACTERIA URNS QL MICRO: ABNORMAL /HPF
BILIRUB UR QL: NEGATIVE
COLOR UR: ABNORMAL
EPITH CASTS URNS QL MICRO: ABNORMAL /LPF
GLUCOSE UR STRIP.AUTO-MCNC: NEGATIVE MG/DL
HGB UR QL STRIP: NEGATIVE
HYALINE CASTS URNS QL MICRO: ABNORMAL /LPF (ref 0–5)
KETONES UR QL STRIP.AUTO: NEGATIVE MG/DL
LEUKOCYTE ESTERASE UR QL STRIP.AUTO: ABNORMAL
NITRITE UR QL STRIP.AUTO: NEGATIVE
PH UR STRIP: 6.5 (ref 5–8)
PROT UR STRIP-MCNC: NEGATIVE MG/DL
RBC #/AREA URNS HPF: ABNORMAL /HPF (ref 0–5)
SP GR UR REFRACTOMETRY: 1.02 (ref 1–1.03)
URINE CULTURE IF INDICATED: ABNORMAL
UROBILINOGEN UR QL STRIP.AUTO: 1 EU/DL (ref 0.2–1)
WBC URNS QL MICRO: ABNORMAL /HPF (ref 0–4)

## 2024-04-22 PROCEDURE — 99213 OFFICE O/P EST LOW 20 MIN: CPT | Performed by: PHYSICIAN ASSISTANT

## 2024-04-22 RX ORDER — FLUCONAZOLE 150 MG/1
150 TABLET ORAL
Qty: 2 TABLET | Refills: 0 | Status: SHIPPED | OUTPATIENT
Start: 2024-04-22 | End: 2024-05-06

## 2024-04-22 ASSESSMENT — PATIENT HEALTH QUESTIONNAIRE - PHQ9
SUM OF ALL RESPONSES TO PHQ QUESTIONS 1-9: 0
SUM OF ALL RESPONSES TO PHQ9 QUESTIONS 1 & 2: 0
2. FEELING DOWN, DEPRESSED OR HOPELESS: NOT AT ALL
1. LITTLE INTEREST OR PLEASURE IN DOING THINGS: NOT AT ALL
SUM OF ALL RESPONSES TO PHQ QUESTIONS 1-9: 0

## 2024-04-22 ASSESSMENT — ENCOUNTER SYMPTOMS
COUGH: 0
SHORTNESS OF BREATH: 0

## 2024-04-22 NOTE — PROGRESS NOTES
I have reviewed all needed documentation in preparation for visit. Verified patient by name and date of birth  Chief Complaint   Patient presents with    Urinary Tract Infection     What's a urinalysis- possible UTI- start 1 week ago- having a lot of inching-        There were no vitals filed for this visit.    Health Maintenance Due   Topic Date Due    Hepatitis B vaccine (1 of 3 - 3-dose series) Never done    COVID-19 Vaccine (1) Never done    DTaP/Tdap/Td vaccine (1 - Tdap) Never done    Cervical cancer screen  Never done    Shingles vaccine (1 of 2) Never done    Depression Screen  03/14/2024     \"Have you been to the ER, urgent care clinic since your last visit?  Hospitalized since your last visit?\"    YES    “Have you seen or consulted any other health care providers outside of [facility] since your last visit?”    YES     “Have you had a pap smear?”    YES    No cervical cancer screening on file             Click Here for Release of Records Request         Karen arteaga CMA

## 2024-04-22 NOTE — PROGRESS NOTES
Celina Ellison is a 58 y.o. female who was seen by synchronous (real-time) audio-video technology on 4/22/2024    Consent: Celina Ellison, who was seen by synchronous (real-time) audio-video technology, and/or her healthcare decision maker, is aware that this patient-initiated, Telehealth encounter on 4/22/2024 is a billable service, with coverage as determined by her insurance carrier. She is aware that she may receive a bill and has provided verbal consent to proceed: YES  Subjective:   Celina Ellison is a 58 y.o. female who was seen for Urinary Tract Infection (What's a urinalysis- possible UTI- start 1 week ago- having a lot of inching- )    Itching in urethra area x 1 week. Not improving with OTC Monistat. No change in sexual partners. No vaginal discharge or dysuria.     Review of Systems   Constitutional:  Negative for fever.   Respiratory:  Negative for cough and shortness of breath.    Cardiovascular:  Negative for chest pain and palpitations.   Genitourinary:  Negative for difficulty urinating, dysuria, genital sores, pelvic pain and vaginal discharge.   Neurological:  Negative for headaches.   Psychiatric/Behavioral:  Negative for dysphoric mood.      Objective:         4/22/2024     1:44 PM   Patient-Reported Vitals   Patient-Reported Weight 190lb   Patient-Reported Height 5'5      General: alert, cooperative, no distress   Mental  status: normal mood, behavior, speech, dress, motor activity, and thought processes, able to follow commands   HENT: NCAT   Neck: no visualized mass   Resp: no respiratory distress   Neuro: no gross deficits   Skin: no discoloration or lesions of concern on visible areas   Psychiatric: normal affect, consistent with stated mood, no evidence of hallucinations     Assessment & Plan:       ICD-10-CM    1. Acute vaginitis  N76.0 fluconazole (DIFLUCAN) 150 MG tablet     Urinalysis with Reflex to Culture      Presumed Yeast Infection. INI 1 week follow up in clinic for a pelvic

## 2024-04-23 LAB
BACTERIA SPEC CULT: NORMAL
SERVICE CMNT-IMP: NORMAL

## 2024-04-23 RX ORDER — NITROFURANTOIN 25; 75 MG/1; MG/1
100 CAPSULE ORAL 2 TIMES DAILY
Qty: 20 CAPSULE | Refills: 0 | Status: SHIPPED | OUTPATIENT
Start: 2024-04-23 | End: 2024-05-03

## 2024-07-30 DIAGNOSIS — I10 ESSENTIAL (PRIMARY) HYPERTENSION: ICD-10-CM

## 2024-07-30 RX ORDER — AMLODIPINE BESYLATE 5 MG/1
5 TABLET ORAL DAILY
Qty: 90 TABLET | Refills: 1 | Status: SHIPPED | OUTPATIENT
Start: 2024-07-30

## 2024-07-30 RX ORDER — OLMESARTAN MEDOXOMIL 20 MG/1
20 TABLET ORAL DAILY
Qty: 90 TABLET | Refills: 1 | Status: SHIPPED | OUTPATIENT
Start: 2024-07-30

## 2024-08-12 ENCOUNTER — TELEPHONE (OUTPATIENT)
Age: 59
End: 2024-08-12

## 2024-08-12 NOTE — TELEPHONE ENCOUNTER
Patient called requesting a virtual appointment with . Patient expressed the was diagnosed with Covid Last Friday. Patient thinks she might have bronchitis.       Please further assist.

## 2024-08-12 NOTE — TELEPHONE ENCOUNTER
Called patient to check her in for her VV. Left vm for patient to return call to get registered for her appt at 4pm.

## 2024-08-13 ENCOUNTER — OFFICE VISIT (OUTPATIENT)
Age: 59
End: 2024-08-13
Payer: COMMERCIAL

## 2024-08-13 VITALS
WEIGHT: 196 LBS | SYSTOLIC BLOOD PRESSURE: 118 MMHG | HEIGHT: 65 IN | HEART RATE: 74 BPM | BODY MASS INDEX: 32.65 KG/M2 | DIASTOLIC BLOOD PRESSURE: 86 MMHG | TEMPERATURE: 97.5 F | OXYGEN SATURATION: 100 % | RESPIRATION RATE: 18 BRPM

## 2024-08-13 DIAGNOSIS — U07.1 COVID-19: Primary | ICD-10-CM

## 2024-08-13 DIAGNOSIS — J30.9 ALLERGIC RHINITIS, UNSPECIFIED SEASONALITY, UNSPECIFIED TRIGGER: ICD-10-CM

## 2024-08-13 PROCEDURE — 99214 OFFICE O/P EST MOD 30 MIN: CPT | Performed by: PHYSICIAN ASSISTANT

## 2024-08-13 PROCEDURE — 3079F DIAST BP 80-89 MM HG: CPT | Performed by: PHYSICIAN ASSISTANT

## 2024-08-13 PROCEDURE — 3074F SYST BP LT 130 MM HG: CPT | Performed by: PHYSICIAN ASSISTANT

## 2024-08-13 RX ORDER — AMOXICILLIN AND CLAVULANATE POTASSIUM 875; 125 MG/1; MG/1
1 TABLET, FILM COATED ORAL 2 TIMES DAILY
Qty: 20 TABLET | Refills: 0 | Status: SHIPPED | OUTPATIENT
Start: 2024-08-13 | End: 2024-08-23

## 2024-08-13 RX ORDER — FLUTICASONE PROPIONATE 50 MCG
2 SPRAY, SUSPENSION (ML) NASAL DAILY
Qty: 3 EACH | Refills: 3 | Status: SHIPPED | OUTPATIENT
Start: 2024-08-13

## 2024-08-13 RX ORDER — GUAIFENESIN 600 MG/1
1200 TABLET, EXTENDED RELEASE ORAL 2 TIMES DAILY
COMMUNITY
Start: 2024-08-13

## 2024-08-13 ASSESSMENT — ENCOUNTER SYMPTOMS
EYE PAIN: 0
TROUBLE SWALLOWING: 0
SINUS PAIN: 0
COUGH: 1
RHINORRHEA: 1
SHORTNESS OF BREATH: 0

## 2024-08-13 NOTE — PROGRESS NOTES
I have reviewed all needed documentation in preparation for visit. Verified patient by name and date of birth  Chief Complaint   Patient presents with    OTHER     Has covid a week ago- possible bronchitis-       Vitals:    08/13/24 0858   BP: (!) 130/90   Site: Right Upper Arm   Position: Sitting   Cuff Size: Medium Adult   Pulse: 74   Resp: 18   Temp: 97.5 °F (36.4 °C)   TempSrc: Temporal   SpO2: 100%   Weight: 88.9 kg (196 lb)   Height: 1.651 m (5' 5\")       Health Maintenance Due   Topic Date Due    Hepatitis B vaccine (1 of 3 - 19+ 3-dose series) Never done    DTaP/Tdap/Td vaccine (2 - Tdap) 09/20/1989    Cervical cancer screen  Never done    Shingles vaccine (1 of 2) Never done    COVID-19 Vaccine (4 - 2023-24 season) 09/01/2023    Flu vaccine (1) 08/01/2024     \"Have you been to the ER, urgent care clinic since your last visit?  Hospitalized since your last visit?\"    YES last Friday-     “Have you seen or consulted any other health care providers outside of [facility] since your last visit?”    Yes     “Have you had a pap smear?”    Yes    No cervical cancer screening on file             Click Here for Release of Records Request         Karen arteaga CMA

## 2024-08-13 NOTE — PROGRESS NOTES
Celina Ellison is a 58 y.o. female  Chief Complaint   Patient presents with    OTHER     Has covid a week ago- possible bronchitis-     Vitals:    08/13/24 0858   BP: (!) 130/90   Pulse: 74   Resp: 18   Temp: 97.5 °F (36.4 °C)   SpO2: 100%      Wt Readings from Last 3 Encounters:   08/13/24 88.9 kg (196 lb)   11/21/23 90.7 kg (200 lb)   03/14/23 88 kg (194 lb)     BMI Readings from Last 3 Encounters:   08/13/24 32.62 kg/m²   11/21/23 33.28 kg/m²   03/14/23 32.28 kg/m²     Health Maintenance Due   Topic Date Due    Hepatitis B vaccine (1 of 3 - 19+ 3-dose series) Never done    DTaP/Tdap/Td vaccine (2 - Tdap) 09/20/1989    Cervical cancer screen  Never done    Shingles vaccine (1 of 2) Never done    COVID-19 Vaccine (4 - 2023-24 season) 09/01/2023    Flu vaccine (1) 08/01/2024     HPI  6 days of coughing. Dx with Covid at  5 days ago. Flu test negative.   Productive cough, some SOB, nasal congestion. No ear pain, or high fever. Has Tried Alkaseltzer Cold Plus once without adequate improvement. Benzonatate not helping.     ROS  Review of Systems   Constitutional:  Negative for fever.   HENT:  Positive for congestion, postnasal drip and rhinorrhea. Negative for ear pain, sinus pain and trouble swallowing.    Eyes:  Negative for pain.   Respiratory:  Positive for cough. Negative for shortness of breath.    Cardiovascular:  Negative for chest pain.   Skin:  Negative for rash.   Allergic/Immunologic: Negative for environmental allergies.   Neurological:  Positive for headaches. Negative for dizziness.   Hematological:  Negative for adenopathy.     EXAM  Physical Exam  Vitals and nursing note reviewed.   Constitutional:       General: She is not in acute distress.     Appearance: Normal appearance. She is not toxic-appearing.   HENT:      Head: Normocephalic and atraumatic.      Ears:      Comments: Bilateral TMs with fluid. No erythema.     Nose: Congestion and rhinorrhea present.      Mouth/Throat:      Mouth: Mucous

## 2025-01-28 DIAGNOSIS — I10 ESSENTIAL (PRIMARY) HYPERTENSION: ICD-10-CM

## 2025-01-28 RX ORDER — AMLODIPINE BESYLATE 5 MG/1
5 TABLET ORAL DAILY
Qty: 90 TABLET | Refills: 0 | Status: SHIPPED | OUTPATIENT
Start: 2025-01-28 | End: 2025-02-12 | Stop reason: SDUPTHER

## 2025-01-28 RX ORDER — OLMESARTAN MEDOXOMIL 20 MG/1
20 TABLET ORAL DAILY
Qty: 90 TABLET | Refills: 0 | Status: SHIPPED | OUTPATIENT
Start: 2025-01-28 | End: 2025-02-12 | Stop reason: SDUPTHER

## 2025-02-12 ENCOUNTER — TELEMEDICINE (OUTPATIENT)
Age: 60
End: 2025-02-12
Payer: COMMERCIAL

## 2025-02-12 DIAGNOSIS — I10 ESSENTIAL (PRIMARY) HYPERTENSION: Primary | ICD-10-CM

## 2025-02-12 DIAGNOSIS — N95.1 MENOPAUSAL AND FEMALE CLIMACTERIC STATES: ICD-10-CM

## 2025-02-12 DIAGNOSIS — R73.09 ELEVATED HEMOGLOBIN A1C: ICD-10-CM

## 2025-02-12 PROCEDURE — 99214 OFFICE O/P EST MOD 30 MIN: CPT | Performed by: PHYSICIAN ASSISTANT

## 2025-02-12 RX ORDER — OLMESARTAN MEDOXOMIL 20 MG/1
20 TABLET ORAL DAILY
Qty: 90 TABLET | Refills: 0 | Status: SHIPPED | OUTPATIENT
Start: 2025-02-12

## 2025-02-12 RX ORDER — GABAPENTIN 100 MG/1
100 CAPSULE ORAL NIGHTLY
Qty: 30 CAPSULE | Refills: 0 | Status: SHIPPED | OUTPATIENT
Start: 2025-02-12 | End: 2025-03-14

## 2025-02-12 RX ORDER — AMLODIPINE BESYLATE 5 MG/1
5 TABLET ORAL DAILY
Qty: 90 TABLET | Refills: 0 | Status: SHIPPED | OUTPATIENT
Start: 2025-02-12

## 2025-02-12 ASSESSMENT — ENCOUNTER SYMPTOMS
SHORTNESS OF BREATH: 0
COUGH: 0

## 2025-02-12 NOTE — PROGRESS NOTES
Celina Ellison is a 59 y.o. female who was seen by synchronous (real-time) audio-video technology on 2/12/2025    Consent: Celina Ellison, who was seen by synchronous (real-time) audio-video technology, and/or her healthcare decision maker, is aware that this patient-initiated, Telehealth encounter on 2/12/2025 is a billable service, with coverage as determined by her insurance carrier. She is aware that she may receive a bill and has provided verbal consent to proceed: YES  Subjective:   Celina Ellison is a 59 y.o. female who was seen for No chief complaint on file.    CV follow up  BP controlled:  BP Readings from Last 3 Encounters:   08/13/24 118/86   11/21/23 116/75   03/14/23 135/86     Currently taking:   Hypertension Medications       Calcium Channel Blockers       amLODIPine (NORVASC) 5 MG tablet Take 1 tablet by mouth daily       Angiotensin II Receptor Antagonists       olmesartan (BENICAR) 20 MG tablet Take 1 tablet by mouth daily            Creatinine (MG/DL)   Date Value   11/21/2023 0.77      Hot flashes - has been taking Gabapentin 300 mg sparingly at night. Can't take it on work nights due to fatigue on this dose but it helps on weekends.     Review of Systems   Constitutional:  Negative for fever.   Respiratory:  Negative for cough and shortness of breath.    Cardiovascular:  Negative for chest pain and palpitations.   Neurological:  Negative for headaches.   Psychiatric/Behavioral:  Negative for dysphoric mood.      Objective:         2/12/2025     8:36 AM   Patient-Reported Vitals   Patient-Reported Systolic 135 mmHg   Patient-Reported Diastolic 70 mmHg      General: alert, cooperative, no distress   Mental  status: normal mood, behavior, speech, dress, motor activity, and thought processes, able to follow commands   HENT: NCAT   Neck: no visualized mass   Resp: no respiratory distress   Neuro: no gross deficits   Skin: no discoloration or lesions of concern on visible areas   Psychiatric:

## 2025-02-17 DIAGNOSIS — R73.09 ELEVATED HEMOGLOBIN A1C: ICD-10-CM

## 2025-02-17 DIAGNOSIS — I10 ESSENTIAL (PRIMARY) HYPERTENSION: ICD-10-CM

## 2025-02-18 LAB
ALBUMIN SERPL-MCNC: 3.6 G/DL (ref 3.5–5)
ALBUMIN/GLOB SERPL: 0.9 (ref 1.1–2.2)
ALP SERPL-CCNC: 128 U/L (ref 45–117)
ALT SERPL-CCNC: 22 U/L (ref 12–78)
ANION GAP SERPL CALC-SCNC: 6 MMOL/L (ref 2–12)
AST SERPL-CCNC: 20 U/L (ref 15–37)
BILIRUB SERPL-MCNC: 0.4 MG/DL (ref 0.2–1)
BUN SERPL-MCNC: 10 MG/DL (ref 6–20)
BUN/CREAT SERPL: 13 (ref 12–20)
CALCIUM SERPL-MCNC: 9.5 MG/DL (ref 8.5–10.1)
CHLORIDE SERPL-SCNC: 109 MMOL/L (ref 97–108)
CO2 SERPL-SCNC: 26 MMOL/L (ref 21–32)
CREAT SERPL-MCNC: 0.77 MG/DL (ref 0.55–1.02)
EST. AVERAGE GLUCOSE BLD GHB EST-MCNC: 126 MG/DL
GLOBULIN SER CALC-MCNC: 3.9 G/DL (ref 2–4)
GLUCOSE SERPL-MCNC: 83 MG/DL (ref 65–100)
HBA1C MFR BLD: 6 % (ref 4–5.6)
POTASSIUM SERPL-SCNC: 3.4 MMOL/L (ref 3.5–5.1)
PROT SERPL-MCNC: 7.5 G/DL (ref 6.4–8.2)
SODIUM SERPL-SCNC: 141 MMOL/L (ref 136–145)

## 2025-06-02 ENCOUNTER — OFFICE VISIT (OUTPATIENT)
Age: 60
End: 2025-06-02
Payer: COMMERCIAL

## 2025-06-02 VITALS
BODY MASS INDEX: 33.08 KG/M2 | TEMPERATURE: 97.6 F | WEIGHT: 198.8 LBS | HEART RATE: 64 BPM | RESPIRATION RATE: 22 BRPM | DIASTOLIC BLOOD PRESSURE: 88 MMHG | OXYGEN SATURATION: 99 % | SYSTOLIC BLOOD PRESSURE: 148 MMHG

## 2025-06-02 DIAGNOSIS — M62.830 SPASM OF THORACIC BACK MUSCLE: Primary | ICD-10-CM

## 2025-06-02 DIAGNOSIS — M54.6 ACUTE LEFT-SIDED THORACIC BACK PAIN: ICD-10-CM

## 2025-06-02 PROCEDURE — 3077F SYST BP >= 140 MM HG: CPT | Performed by: NURSE PRACTITIONER

## 2025-06-02 PROCEDURE — 99213 OFFICE O/P EST LOW 20 MIN: CPT | Performed by: NURSE PRACTITIONER

## 2025-06-02 PROCEDURE — 3079F DIAST BP 80-89 MM HG: CPT | Performed by: NURSE PRACTITIONER

## 2025-06-02 RX ORDER — NAPROXEN 500 MG/1
500 TABLET ORAL 2 TIMES DAILY WITH MEALS
Qty: 14 TABLET | Refills: 0 | Status: SHIPPED | OUTPATIENT
Start: 2025-06-02 | End: 2025-06-09

## 2025-06-02 SDOH — ECONOMIC STABILITY: INCOME INSECURITY: IN THE LAST 12 MONTHS, WAS THERE A TIME WHEN YOU WERE NOT ABLE TO PAY THE MORTGAGE OR RENT ON TIME?: NO

## 2025-06-02 SDOH — ECONOMIC STABILITY: FOOD INSECURITY: WITHIN THE PAST 12 MONTHS, THE FOOD YOU BOUGHT JUST DIDN'T LAST AND YOU DIDN'T HAVE MONEY TO GET MORE.: PATIENT DECLINED

## 2025-06-02 SDOH — ECONOMIC STABILITY: FOOD INSECURITY: WITHIN THE PAST 12 MONTHS, YOU WORRIED THAT YOUR FOOD WOULD RUN OUT BEFORE YOU GOT MONEY TO BUY MORE.: PATIENT DECLINED

## 2025-06-02 SDOH — ECONOMIC STABILITY: TRANSPORTATION INSECURITY
IN THE PAST 12 MONTHS, HAS THE LACK OF TRANSPORTATION KEPT YOU FROM MEDICAL APPOINTMENTS OR FROM GETTING MEDICATIONS?: NO

## 2025-06-02 ASSESSMENT — PATIENT HEALTH QUESTIONNAIRE - PHQ9
2. FEELING DOWN, DEPRESSED OR HOPELESS: NOT AT ALL
SUM OF ALL RESPONSES TO PHQ QUESTIONS 1-9: 0
SUM OF ALL RESPONSES TO PHQ QUESTIONS 1-9: 0
1. LITTLE INTEREST OR PLEASURE IN DOING THINGS: NOT AT ALL
SUM OF ALL RESPONSES TO PHQ QUESTIONS 1-9: 0
SUM OF ALL RESPONSES TO PHQ QUESTIONS 1-9: 0

## 2025-06-02 ASSESSMENT — ENCOUNTER SYMPTOMS
BACK PAIN: 1
CHEST TIGHTNESS: 0
SHORTNESS OF BREATH: 0

## 2025-06-02 NOTE — PROGRESS NOTES
Celina Ellison is a 59 y.o. female  Chief Complaint   Patient presents with    Back Pain     On left side of back from top to midway down, started Saturday, worse yesterday.  Pt denies injury.  Reports history of arthtitis and shingles  States she does have a burning sensation       Vitals:    06/02/25 1518 06/02/25 1520   BP: (!) 147/91 (!) 148/88   BP Site: Right Upper Arm Left Upper Arm   Patient Position: Sitting Sitting   BP Cuff Size: Medium Adult Large Adult   Pulse: 64    Resp: 22    Temp: 97.6 °F (36.4 °C)    TempSrc: Temporal    SpO2: 99%    Weight: 90.2 kg (198 lb 12.8 oz)       Wt Readings from Last 3 Encounters:   06/02/25 90.2 kg (198 lb 12.8 oz)   08/13/24 88.9 kg (196 lb)   11/21/23 90.7 kg (200 lb)     BMI Readings from Last 3 Encounters:   06/02/25 33.08 kg/m²   08/13/24 32.62 kg/m²   11/21/23 33.28 kg/m²     Health Maintenance Due   Topic Date Due    Hepatitis B vaccine (1 of 3 - 19+ 3-dose series) Never done    DTaP/Tdap/Td vaccine (5 - Tdap) 09/10/1991    Cervical cancer screen  Never done    Shingles vaccine (1 of 2) Never done    Pneumococcal 50+ years Vaccine (1 of 1 - PCV) Never done    COVID-19 Vaccine (4 - 2024-25 season) 09/01/2024    Depression Screen  04/22/2025     HPI  Patient of Annemarie Dariusz here for an acute visit.     Patient with complaint of  nontraumatic excruciating back pain. Started while  Driving.    Symptoms started 2 days ago.   Pain is located  left thoracic.   Constant.   Described as    burning.   Aches.    Denies lweakness, numbness of fingers. Neck pain.  No previous evaluation. None   Reports having shingles and arthritis.    No rash.    Did not  have shingles vaccine. Last outbreak 2010.  Same location.  Current treatment heating pad.  Muscle cream/ tylenol    Works as a .   ROS  Review of Systems   Constitutional:  Negative for fatigue and fever.   Respiratory:  Negative for chest tightness and shortness of breath.    Cardiovascular:  Negative for chest

## 2025-06-02 NOTE — PROGRESS NOTES
I have reviewed all needed documentation in preparation for visit. Verified patient by name and date of birth    Chief Complaint   Patient presents with    Back Pain     On left side of back from top to midway down, started Saturday, worse yesterday.  Pt denies injury.  Reports history of arthtitis and shingles  States she does have a burning sensation       Have you been to the ER, urgent care clinic since your last visit?  Hospitalized since your last visit?   NO    Have you seen or consulted any other health care providers outside our system since your last visit?   NO     “Have you had a pap smear?”    YES - Where: [facility], not sure who ordered it Nurse/CMA to request most recent records if not in the chart    No cervical cancer screening on file              Vitals:    06/02/25 1518 06/02/25 1520   BP: (!) 147/91 (!) 148/88   BP Site: Right Upper Arm Left Upper Arm   Patient Position: Sitting Sitting   BP Cuff Size: Medium Adult Large Adult   Pulse: 64    Resp: 22    Temp: 97.6 °F (36.4 °C)    TempSrc: Temporal    SpO2: 99%    Weight: 90.2 kg (198 lb 12.8 oz)        Health Maintenance Due   Topic Date Due    Hepatitis B vaccine (1 of 3 - 19+ 3-dose series) Never done    DTaP/Tdap/Td vaccine (5 - Tdap) 09/10/1991    Cervical cancer screen  Never done    Shingles vaccine (1 of 2) Never done    Pneumococcal 50+ years Vaccine (1 of 1 - PCV) Never done    COVID-19 Vaccine (4 - 2024-25 season) 09/01/2024    Depression Screen  04/22/2025       Zoya Scott LPN

## 2025-06-12 ENCOUNTER — PATIENT MESSAGE (OUTPATIENT)
Age: 60
End: 2025-06-12

## 2025-06-12 RX ORDER — BACLOFEN 10 MG/1
10 TABLET ORAL 3 TIMES DAILY
Qty: 30 TABLET | Refills: 0 | Status: SHIPPED | OUTPATIENT
Start: 2025-06-12 | End: 2025-06-22

## 2025-08-22 ENCOUNTER — TELEMEDICINE (OUTPATIENT)
Age: 60
End: 2025-08-22

## 2025-08-22 DIAGNOSIS — N89.8 VAGINAL ITCHING: ICD-10-CM

## 2025-08-22 DIAGNOSIS — Z87.440 HISTORY OF UTI: ICD-10-CM

## 2025-08-22 DIAGNOSIS — B37.31 VAGINAL YEAST INFECTION: Primary | ICD-10-CM

## 2025-08-22 RX ORDER — FLUCONAZOLE 150 MG/1
150 TABLET ORAL ONCE
Qty: 1 TABLET | Refills: 0 | Status: SHIPPED | OUTPATIENT
Start: 2025-08-22 | End: 2025-08-22